# Patient Record
Sex: MALE | Race: WHITE | NOT HISPANIC OR LATINO | Employment: OTHER | URBAN - METROPOLITAN AREA
[De-identification: names, ages, dates, MRNs, and addresses within clinical notes are randomized per-mention and may not be internally consistent; named-entity substitution may affect disease eponyms.]

---

## 2017-07-21 ENCOUNTER — ALLSCRIPTS OFFICE VISIT (OUTPATIENT)
Dept: OTHER | Facility: OTHER | Age: 77
End: 2017-07-21

## 2017-07-21 LAB
BILIRUB UR QL STRIP: NORMAL
CLARITY UR: NORMAL
COLOR UR: YELLOW
GLUCOSE (HISTORICAL): NORMAL
HGB UR QL STRIP.AUTO: NORMAL
KETONES UR STRIP-MCNC: NORMAL MG/DL
LEUKOCYTE ESTERASE UR QL STRIP: NORMAL
NITRITE UR QL STRIP: NORMAL
PH UR STRIP.AUTO: 6.5 [PH]
PROT UR STRIP-MCNC: NORMAL MG/DL
SP GR UR STRIP.AUTO: 1.01
UROBILINOGEN UR QL STRIP.AUTO: 0.2

## 2017-07-25 ENCOUNTER — ALLSCRIPTS OFFICE VISIT (OUTPATIENT)
Dept: OTHER | Facility: OTHER | Age: 77
End: 2017-07-25

## 2017-07-25 LAB
BILIRUB UR QL STRIP: NORMAL
CLARITY UR: NORMAL
COLOR UR: YELLOW
GLUCOSE (HISTORICAL): NORMAL
HGB UR QL STRIP.AUTO: NORMAL
KETONES UR STRIP-MCNC: NORMAL MG/DL
LEUKOCYTE ESTERASE UR QL STRIP: NORMAL
NITRITE UR QL STRIP: NORMAL
PH UR STRIP.AUTO: 6 [PH]
PROT UR STRIP-MCNC: NORMAL MG/DL
SP GR UR STRIP.AUTO: 1.01
UROBILINOGEN UR QL STRIP.AUTO: 0.2

## 2018-01-12 VITALS
WEIGHT: 222 LBS | SYSTOLIC BLOOD PRESSURE: 130 MMHG | HEIGHT: 68 IN | BODY MASS INDEX: 33.65 KG/M2 | DIASTOLIC BLOOD PRESSURE: 82 MMHG

## 2018-01-12 NOTE — MISCELLANEOUS
Message   Recorded as Task   Date: 07/20/2017 02:15 PM, Created By: Katerina Zendejas   Task Name: Medical Complaint Callback   Assigned To: Elia NUNEZ,TEAM   Regarding Patient: Dory Garcia, Status: Active   Comment:    Cherie Dumont - 20 Jul 2017 2:15 PM     TASK CREATED  Caller: Self; Medical Complaint; [phone] (Home)  PATIENT CALLED SAYING HIS PCP TREATED HIM FOR AN INFECTION BECUASE HE HAS BURNING  HIS CULTURE CAME BACK NEGATIVE AND HE STILL HAS THE BURNING  HE HAS BEEN TAKING CIPRO AND IT DOESNT SEEM TO BE HELPING  Christina Stone - 20 Jul 2017 2:36 PM     TASK EDITED  Pt states that he is still having burning and frequency  Scheduled pt to see Waldo Jovel 07/21/17          Signatures   Electronically signed by : Srinivas Su, ; Jul 20 2017  2:36PM EST                       (Author)

## 2018-06-01 RX ORDER — SUCRALFATE 1 G/1
TABLET ORAL AS NEEDED
COMMUNITY

## 2018-06-01 RX ORDER — CHLORAL HYDRATE 500 MG
CAPSULE ORAL
COMMUNITY

## 2018-06-01 RX ORDER — SIMVASTATIN 80 MG
TABLET ORAL
COMMUNITY

## 2018-06-01 RX ORDER — PANTOPRAZOLE SODIUM 40 MG/1
TABLET, DELAYED RELEASE ORAL
COMMUNITY

## 2018-06-01 RX ORDER — TERAZOSIN 5 MG/1
CAPSULE ORAL
COMMUNITY

## 2018-06-01 RX ORDER — NIFEDIPINE 60 MG/1
TABLET, EXTENDED RELEASE ORAL
COMMUNITY

## 2018-06-01 RX ORDER — FINASTERIDE 5 MG/1
TABLET, FILM COATED ORAL
COMMUNITY
End: 2018-06-05 | Stop reason: SDUPTHER

## 2018-06-01 RX ORDER — NIACIN 500 MG
TABLET ORAL
COMMUNITY

## 2018-06-05 ENCOUNTER — OFFICE VISIT (OUTPATIENT)
Dept: UROLOGY | Facility: MEDICAL CENTER | Age: 78
End: 2018-06-05
Payer: MEDICARE

## 2018-06-05 VITALS
SYSTOLIC BLOOD PRESSURE: 124 MMHG | HEIGHT: 70 IN | DIASTOLIC BLOOD PRESSURE: 68 MMHG | WEIGHT: 231 LBS | BODY MASS INDEX: 33.07 KG/M2

## 2018-06-05 DIAGNOSIS — N40.1 BPH WITH OBSTRUCTION/LOWER URINARY TRACT SYMPTOMS: Primary | ICD-10-CM

## 2018-06-05 DIAGNOSIS — N13.8 BPH WITH OBSTRUCTION/LOWER URINARY TRACT SYMPTOMS: Primary | ICD-10-CM

## 2018-06-05 PROBLEM — K21.9 GERD (GASTROESOPHAGEAL REFLUX DISEASE): Status: ACTIVE | Noted: 2018-06-05

## 2018-06-05 PROBLEM — Z86.010 HISTORY OF COLON POLYPS: Status: ACTIVE | Noted: 2017-06-06

## 2018-06-05 PROBLEM — Z86.0100 HISTORY OF COLON POLYPS: Status: ACTIVE | Noted: 2017-06-06

## 2018-06-05 PROBLEM — E66.09 NON MORBID OBESITY DUE TO EXCESS CALORIES: Status: ACTIVE | Noted: 2017-06-06

## 2018-06-05 LAB
SL AMB  POCT GLUCOSE, UA: ABNORMAL
SL AMB LEUKOCYTE ESTERASE,UA: ABNORMAL
SL AMB POCT BILIRUBIN,UA: ABNORMAL
SL AMB POCT BLOOD,UA: ABNORMAL
SL AMB POCT CLARITY,UA: CLEAR
SL AMB POCT COLOR,UA: YELLOW
SL AMB POCT KETONES,UA: ABNORMAL
SL AMB POCT NITRITE,UA: ABNORMAL
SL AMB POCT PH,UA: 5.5
SL AMB POCT SPECIFIC GRAVITY,UA: 1.01
SL AMB POCT URINE PROTEIN: ABNORMAL
SL AMB POCT UROBILINOGEN: 0.2

## 2018-06-05 PROCEDURE — 99214 OFFICE O/P EST MOD 30 MIN: CPT | Performed by: UROLOGY

## 2018-06-05 PROCEDURE — 81003 URINALYSIS AUTO W/O SCOPE: CPT | Performed by: UROLOGY

## 2018-06-05 RX ORDER — SENNA PLUS 8.6 MG/1
TABLET ORAL
COMMUNITY

## 2018-06-05 RX ORDER — EPINEPHRINE 0.3 MG/.3ML
INJECTION SUBCUTANEOUS
COMMUNITY
Start: 2015-11-16

## 2018-06-05 RX ORDER — FERROUS SULFATE 325(65) MG
TABLET ORAL
COMMUNITY
Start: 2017-12-13

## 2018-06-05 RX ORDER — FINASTERIDE 5 MG/1
5 TABLET, FILM COATED ORAL DAILY
Qty: 90 TABLET | Refills: 3 | Status: SHIPPED | OUTPATIENT
Start: 2018-06-05

## 2018-06-05 RX ORDER — ECHINACEA ANGUSTIFOLIA ROOT 125 MG
TABLET ORAL
COMMUNITY

## 2018-06-05 NOTE — PROGRESS NOTES
IPSS Questionnaire (AUA-7): Over the past month    1)  How often have you had a sensation of not emptying your bladder completely after you finish urinating? 1 - Less than 1 time in 5   2)  How often have you had to urinate again less than two hours after you finished urinating? 1 - Less than 1 time in 5   3)  How often have you found you stopped and started again several times when you urinated? 0 - Not at all   4) How difficult have you found it to postpone urination? 1 - Less than 1 time in 5   5) How often have you had a weak urinary stream?  1 - Less than 1 time in 5   6) How often have you had to push or strain to begin urination? 0 - Not at all   7) How many times did you most typically get up to urinate from the time you went to bed until the time you got up in the morning?   2 - 2 times   Total Score:  6

## 2018-06-05 NOTE — LETTER
June 5, 2018     Theodore Valdez, [address] [facility]  [address] [address]    Patient: Letty Mratinez   YOB: 1940   Date of Visit: 6/5/2018       Dear Dr Mathilda Gitelman: Thank you for referring Letty Martinez to me for evaluation  Below are my notes for this consultation  If you have questions, please do not hesitate to call me  I look forward to following your patient along with you  Sincerely,        Mya Babcock MD        CC: No Recipients  Mya Babcock MD  6/5/2018  1:18 PM  Incomplete  Assessment/Plan:    No problem-specific Assessment & Plan notes found for this encounter  Diagnoses and all orders for this visit:    BPH with obstruction/lower urinary tract symptoms  -     POCT urine dip auto non-scope    Other orders  -     Multiple Vitamins-Minerals (CENTRUM SILVER 50+MEN PO); Take by mouth  -     finasteride (PROSCAR) 5 mg tablet; Take by mouth  -     niacin 500 mg tablet; Take by mouth  -     NIFEdipine (NIFEDICAL XL) 60 mg 24 hr tablet; Take by mouth  -     Omega-3 1000 MG CAPS; Take by mouth  -     pantoprazole (PROTONIX) 40 mg tablet; Take by mouth  -     Probiotic Product (PROBIOTIC-10) CAPS; Take by mouth  -     simvastatin (ZOCOR) 80 mg tablet; Take by mouth  -     sucralfate (CARAFATE) 1 g tablet; Take by mouth  -     terazosin (HYTRIN) 5 mg capsule; Take by mouth  -     Zinc Sulfate (ZINC 15) 66 MG TABS; Take by mouth  -     EPINEPHrine (EPIPEN 2-SHAMA) 0 3 mg/0 3 mL SOAJ; Use as directed for allergic reaction   -     ferrous sulfate 325 (65 Fe) mg tablet; 1 tab every other day for iron support  -     senna (SENOKOT) 8 6 MG tablet; Indications: Bowel Evacuation, Constipation  Take 1-3 tablets by mouth daily   -     Nutritional Supplements (PROSTAMEN) CAPS; ( PROSTA MEITO) Take 1 tab by mouth twice a day for prostate health  Subjective:      Patient ID: Letty Martinez is a 68 y o  male      HPI    The following portions of the patient's history were reviewed and updated as appropriate: allergies, current medications, past family history, past medical history, past social history, past surgical history and problem list     Review of Systems      Objective:      /68 (BP Location: Left arm, Patient Position: Sitting)   Ht 5' 10" (1 778 m)   Wt 105 kg (231 lb)   BMI 33 15 kg/m²           Physical Exam

## 2018-06-05 NOTE — ASSESSMENT & PLAN NOTE
AUA symptom score is 6 on combined medical therapy  He is satisfied with his voiding pattern  We will continue to follow on present therapy  Cystoscopy last year documented a 60 g prostate with moderate bladder trabeculation  He will return in 1 year  We discussed the pros and cons of PSA testing

## 2018-06-05 NOTE — PROGRESS NOTES
Assessment/Plan:    BPH with obstruction/lower urinary tract symptoms  AUA symptom score is 6 on combined medical therapy  He is satisfied with his voiding pattern  We will continue to follow on present therapy  Cystoscopy last year documented a 60 g prostate with moderate bladder trabeculation  He will return in 1 year  We discussed the pros and cons of PSA testing  Diagnoses and all orders for this visit:    BPH with obstruction/lower urinary tract symptoms  -     POCT urine dip auto non-scope    Other orders  -     Multiple Vitamins-Minerals (CENTRUM SILVER 50+MEN PO); Take by mouth  -     finasteride (PROSCAR) 5 mg tablet; Take by mouth  -     niacin 500 mg tablet; Take by mouth  -     NIFEdipine (NIFEDICAL XL) 60 mg 24 hr tablet; Take by mouth  -     Omega-3 1000 MG CAPS; Take by mouth  -     pantoprazole (PROTONIX) 40 mg tablet; Take by mouth  -     Probiotic Product (PROBIOTIC-10) CAPS; Take by mouth  -     simvastatin (ZOCOR) 80 mg tablet; Take by mouth  -     sucralfate (CARAFATE) 1 g tablet; Take by mouth  -     terazosin (HYTRIN) 5 mg capsule; Take by mouth  -     Zinc Sulfate (ZINC 15) 66 MG TABS; Take by mouth  -     EPINEPHrine (EPIPEN 2-SHAMA) 0 3 mg/0 3 mL SOAJ; Use as directed for allergic reaction   -     ferrous sulfate 325 (65 Fe) mg tablet; 1 tab every other day for iron support  -     senna (SENOKOT) 8 6 MG tablet; Indications: Bowel Evacuation, Constipation  Take 1-3 tablets by mouth daily   -     Nutritional Supplements (PROSTAMEN) CAPS; ( PROSTA MEITO) Take 1 tab by mouth twice a day for prostate health  Subjective:      Patient ID: Tony Gunn is a 68 y o  male  66-year-old male followed for lower tract symptoms secondary prostatic enlargement  He reports he is voiding well on combined therapy  His stream is adequate  He gets up twice a night to urinate  He denies gross hematuria, dysuria or symptoms of infection  He has no urgency or incontinence    He is satisfied with his voiding pattern  The following portions of the patient's history were reviewed and updated as appropriate: allergies, current medications, past family history, past medical history, past social history, past surgical history and problem list     Review of Systems   Constitutional: Negative for chills, diaphoresis, fatigue and fever  HENT: Negative  Eyes: Negative  Respiratory: Negative  Cardiovascular: Negative  Endocrine: Negative  Musculoskeletal: Negative  Skin: Negative  Allergic/Immunologic: Negative  Neurological: Negative  Hematological: Negative  Psychiatric/Behavioral: Negative  Objective:      /68 (BP Location: Left arm, Patient Position: Sitting)   Ht 5' 10" (1 778 m)   Wt 105 kg (231 lb)   BMI 33 15 kg/m²          Physical Exam   Constitutional: He is oriented to person, place, and time  He appears well-developed and well-nourished  HENT:   Head: Normocephalic and atraumatic  Eyes: Conjunctivae are normal    Neck: Neck supple  Cardiovascular: Normal rate  Pulmonary/Chest: Effort normal    Abdominal: Soft  Bowel sounds are normal  He exhibits no distension and no mass  There is no tenderness  There is no rebound, no guarding and no CVA tenderness  Hernia confirmed negative in the right inguinal area and confirmed negative in the left inguinal area  Question of left inguinal hernia   Genitourinary: Rectum normal, testes normal and penis normal  Prostate is enlarged  Prostate is not tender  Right testis shows no mass  Left testis shows no mass  No phimosis or hypospadias  Genitourinary Comments: Prostate 2 times enlarged and palpably benign  Musculoskeletal: He exhibits no edema  Neurological: He is alert and oriented to person, place, and time  Skin: Skin is warm and dry  Psychiatric: He has a normal mood and affect   His behavior is normal  Judgment and thought content normal    Nursing note and vitals reviewed

## 2018-06-05 NOTE — PATIENT INSTRUCTIONS
Benign Prostatic Hypertrophy   WHAT YOU NEED TO KNOW:   Benign prostatic hypertrophy (BPH) is a condition that causes your prostate gland to grow larger than normal  The prostate gland is the male sex gland that produces a fluid that is part of semen  It is about the size of a walnut and it is located under the bladder  As the prostate grows, it can squeeze the urethra  This can block urine flow and cause urinary problems  DISCHARGE INSTRUCTIONS:   Medicines:   · Alpha blockers: This medicine relaxes the muscles in your prostate and bladder  It may help you urinate more easily  · 5 alpha reductase inhibitors: These medicines block the production of a hormone that causes the prostate to get larger  It may help slow the growth of the prostate or shrink the prostate  · Take your medicine as directed  Contact your healthcare provider if you think your medicine is not helping or if you have side effects  Tell him or her if you are allergic to any medicine  Keep a list of the medicines, vitamins, and herbs you take  Include the amounts, and when and why you take them  Bring the list or the pill bottles to follow-up visits  Carry your medicine list with you in case of an emergency  Follow up with your healthcare provider as directed:  Write down your questions so you remember to ask them during your visits  Manage BPH:   · Do not let your bladder get too full before you empty it  Urinate when you feel the urge  · Limit alcohol  Do not drink large amounts of any liquid at one time  · Decrease the amount of salt you eat  Examples of salty foods are chips, cured meats, and canned soups  Do not use table salt  · Healthcare providers may tell you not to eat spicy foods such as chilli peppers  This may help you find out if spicy food makes your BPH symptoms worse  · You may have sex if you feel well  Contact your healthcare provider if:   · There is a large amount of blood in your urine  · Your signs and symptoms get worse  · You have a fever  · You have questions or concerns about your condition or care  Seek care immediately if:   · You are unable to urinate  · Your bladder feels very full and painful  © 2017 [employer] Information is for End User's use only and may not be sold, redistributed or otherwise used for commercial purposes  All illustrations and images included in CareNotes® are the copyrighted property of [employer] M [employer]  or [employer]  The above information is an  only  It is not intended as medical advice for individual conditions or treatments  Talk to your doctor, nurse or pharmacist before following any medical regimen to see if it is safe and effective for you

## 2019-08-07 ENCOUNTER — OFFICE VISIT (OUTPATIENT)
Dept: UROLOGY | Facility: MEDICAL CENTER | Age: 79
End: 2019-08-07
Payer: MEDICARE

## 2019-08-07 VITALS
BODY MASS INDEX: 34.28 KG/M2 | DIASTOLIC BLOOD PRESSURE: 76 MMHG | SYSTOLIC BLOOD PRESSURE: 140 MMHG | WEIGHT: 226.2 LBS | HEIGHT: 68 IN | HEART RATE: 58 BPM

## 2019-08-07 DIAGNOSIS — N13.8 BPH WITH OBSTRUCTION/LOWER URINARY TRACT SYMPTOMS: Primary | ICD-10-CM

## 2019-08-07 DIAGNOSIS — N40.1 BPH WITH OBSTRUCTION/LOWER URINARY TRACT SYMPTOMS: Primary | ICD-10-CM

## 2019-08-07 LAB
SL AMB  POCT GLUCOSE, UA: NEGATIVE
SL AMB LEUKOCYTE ESTERASE,UA: NEGATIVE
SL AMB POCT BILIRUBIN,UA: NEGATIVE
SL AMB POCT BLOOD,UA: NEGATIVE
SL AMB POCT CLARITY,UA: CLEAR
SL AMB POCT COLOR,UA: YELLOW
SL AMB POCT KETONES,UA: NEGATIVE
SL AMB POCT NITRITE,UA: NEGATIVE
SL AMB POCT PH,UA: 7
SL AMB POCT SPECIFIC GRAVITY,UA: 1.01
SL AMB POCT URINE PROTEIN: NEGATIVE
SL AMB POCT UROBILINOGEN: 1

## 2019-08-07 PROCEDURE — 99214 OFFICE O/P EST MOD 30 MIN: CPT | Performed by: UROLOGY

## 2019-08-07 PROCEDURE — 81003 URINALYSIS AUTO W/O SCOPE: CPT | Performed by: UROLOGY

## 2019-08-07 NOTE — LETTER
August 7, 2019     Ted Arenas, [address] [facility]  [address] [address]    Patient: Dalton Arenas   YOB: 1940   Date of Visit: 8/7/2019       Dear Dr Terrell Johnson: Thank you for referring Percy Winslow to me for evaluation  Below are my notes for this consultation  If you have questions, please do not hesitate to call me  I look forward to following your patient along with you  Sincerely,        Bj Sena MD        CC: No Recipients  Bj Sena MD  8/7/2019  9:22 AM  Sign at close encounter  Assessment/Plan:    BPH with obstruction/lower urinary tract symptoms  AUA symptom score is 8  He is satisfied with his voiding pattern on combined therapy  Urinalysis is negative  PSA last year was 0 7  We discussed the pros and cons of PSA testing and I do not feel he needs a repeat PSA at this time  We did discuss treatment options for his lower tract symptoms  If he would like to get off medication urolift would be a good choice  The procedure was described and risks discussed  Information was given  He will call if he wishes to proceed with a urolift evaluation  If not, he will remain on combined therapy and return in 1 year for follow-up  Diagnoses and all orders for this visit:    BPH with obstruction/lower urinary tract symptoms  -     POCT urine dip auto non-scope          Subjective:      Patient ID: Dalton Arenas is a 66 y o  male  Benign Prostatic Hypertrophy   This is a chronic problem  The current episode started more than 1 year ago  The problem is unchanged  Irritative symptoms include nocturia  Irritative symptoms do not include frequency or urgency  Obstructive symptoms include dribbling and a slower stream  Obstructive symptoms do not include incomplete emptying, an intermittent stream, straining or a weak stream  Pertinent negatives include no chills, dysuria, genital pain, hematuria, hesitancy, nausea or vomiting  AUA score is 8-19  His sexual activity is non-contributory to the current illness  The symptoms are aggravated by caffeine  Past treatments include terazosin and finasteride  The treatment provided moderate relief  He has been using treatment for 2 or more years  The following portions of the patient's history were reviewed and updated as appropriate: allergies, current medications, past family history, past medical history, past social history, past surgical history and problem list     Review of Systems   Constitutional: Negative for chills, diaphoresis, fatigue and fever  HENT: Negative  Eyes: Negative  Respiratory: Negative  Cardiovascular: Negative  Gastrointestinal: Positive for constipation  Negative for nausea and vomiting  Endocrine: Negative  Genitourinary: Positive for nocturia  Negative for dysuria, frequency, hematuria, hesitancy, incomplete emptying and urgency  See HPI   Musculoskeletal: Negative  Skin: Negative  Allergic/Immunologic: Negative  Neurological: Negative  Hematological: Negative  Psychiatric/Behavioral: Negative  AUA SYMPTOM SCORE      Most Recent Value   AUA SYMPTOM SCORE   How often have you had a sensation of not emptying your bladder completely after you finished urinating? 2   How often have you had to urinate again less than two hours after you finished urinating? 1   How often have you found you stopped and started again several times when you urinate? 1   How often have you found it difficult to postpone urination? 1   How often have you had a weak urinary stream?  1   How often have you had to push or strain to begin urination? 0   How many times did you most typically get up to urinate from the time you went to bed at night until the time you got up in the morning?   2   Quality of Life: If you were to spend the rest of your life with your urinary condition just the way it is now, how would you feel about that?  2   AUA SYMPTOM SCORE  8 Objective:      /76 (BP Location: Left arm, Patient Position: Sitting, Cuff Size: Standard)   Pulse 58   Ht 5' 7 5" (1 715 m)   Wt 103 kg (226 lb 3 2 oz)   BMI 34 91 kg/m²           Physical Exam   Constitutional: He is oriented to person, place, and time  He appears well-developed and well-nourished  HENT:   Head: Normocephalic and atraumatic  Eyes: Conjunctivae are normal    Neck: Neck supple  Cardiovascular: Normal rate  Pulmonary/Chest: Effort normal    Abdominal: Soft  Bowel sounds are normal  He exhibits no distension and no mass  There is no tenderness  There is no rebound, no guarding and no CVA tenderness  No hernia  Genitourinary: Rectum normal, testes normal and penis normal  Right testis shows no mass  Left testis shows no mass  No phimosis or hypospadias  Genitourinary Comments: Prostate 1 5 x enlarged and palpably benign   Musculoskeletal: He exhibits no edema  Neurological: He is alert and oriented to person, place, and time  Skin: Skin is warm and dry  Psychiatric: He has a normal mood and affect  His behavior is normal  Judgment and thought content normal    Vitals reviewed

## 2019-08-07 NOTE — PATIENT INSTRUCTIONS
Benign Prostatic Hypertrophy   WHAT YOU NEED TO KNOW:   Benign prostatic hypertrophy (BPH) is a condition that causes your prostate gland to grow larger than normal  The prostate gland is the male sex gland that produces a fluid that is part of semen  It is about the size of a walnut and it is located under the bladder  As the prostate grows, it can squeeze the urethra  This can block urine flow and cause urinary problems  DISCHARGE INSTRUCTIONS:   Medicines:   · Alpha blockers: This medicine relaxes the muscles in your prostate and bladder  It may help you urinate more easily  · 5 alpha reductase inhibitors: These medicines block the production of a hormone that causes the prostate to get larger  It may help slow the growth of the prostate or shrink the prostate  · Take your medicine as directed  Contact your healthcare provider if you think your medicine is not helping or if you have side effects  Tell him or her if you are allergic to any medicine  Keep a list of the medicines, vitamins, and herbs you take  Include the amounts, and when and why you take them  Bring the list or the pill bottles to follow-up visits  Carry your medicine list with you in case of an emergency  Follow up with your healthcare provider as directed:  Write down your questions so you remember to ask them during your visits  Manage BPH:   · Do not let your bladder get too full before you empty it  Urinate when you feel the urge  · Limit alcohol  Do not drink large amounts of any liquid at one time  · Decrease the amount of salt you eat  Examples of salty foods are chips, cured meats, and canned soups  Do not use table salt  · Healthcare providers may tell you not to eat spicy foods such as chilli peppers  This may help you find out if spicy food makes your BPH symptoms worse  · You may have sex if you feel well  Contact your healthcare provider if:   · There is a large amount of blood in your urine  · Your signs and symptoms get worse  · You have a fever  · You have questions or concerns about your condition or care  Seek care immediately if:   · You are unable to urinate  · Your bladder feels very full and painful  © 2017 [employer] Information is for End User's use only and may not be sold, redistributed or otherwise used for commercial purposes  All illustrations and images included in CareNotes® are the copyrighted property of [employer]  or [employer]  The above information is an  only  It is not intended as medical advice for individual conditions or treatments  Talk to your doctor, nurse or pharmacist before following any medical regimen to see if it is safe and effective for you      Urolift information was provided

## 2019-08-07 NOTE — PROGRESS NOTES
Assessment/Plan:    BPH with obstruction/lower urinary tract symptoms  AUA symptom score is 8  He is satisfied with his voiding pattern on combined therapy  Urinalysis is negative  PSA last year was 0 7  We discussed the pros and cons of PSA testing and I do not feel he needs a repeat PSA at this time  We did discuss treatment options for his lower tract symptoms  If he would like to get off medication urolift would be a good choice  The procedure was described and risks discussed  Information was given  He will call if he wishes to proceed with a urolift evaluation  If not, he will remain on combined therapy and return in 1 year for follow-up  Diagnoses and all orders for this visit:    BPH with obstruction/lower urinary tract symptoms  -     POCT urine dip auto non-scope          Subjective:      Patient ID: Mehdi Scruggs is a 66 y o  male  Benign Prostatic Hypertrophy   This is a chronic problem  The current episode started more than 1 year ago  The problem is unchanged  Irritative symptoms include nocturia  Irritative symptoms do not include frequency or urgency  Obstructive symptoms include dribbling and a slower stream  Obstructive symptoms do not include incomplete emptying, an intermittent stream, straining or a weak stream  Pertinent negatives include no chills, dysuria, genital pain, hematuria, hesitancy, nausea or vomiting  AUA score is 8-19  His sexual activity is non-contributory to the current illness  The symptoms are aggravated by caffeine  Past treatments include terazosin and finasteride  The treatment provided moderate relief  He has been using treatment for 2 or more years         The following portions of the patient's history were reviewed and updated as appropriate: allergies, current medications, past family history, past medical history, past social history, past surgical history and problem list     Review of Systems   Constitutional: Negative for chills, diaphoresis, fatigue and fever  HENT: Negative  Eyes: Negative  Respiratory: Negative  Cardiovascular: Negative  Gastrointestinal: Positive for constipation  Negative for nausea and vomiting  Endocrine: Negative  Genitourinary: Positive for nocturia  Negative for dysuria, frequency, hematuria, hesitancy, incomplete emptying and urgency  See HPI   Musculoskeletal: Negative  Skin: Negative  Allergic/Immunologic: Negative  Neurological: Negative  Hematological: Negative  Psychiatric/Behavioral: Negative  AUA SYMPTOM SCORE      Most Recent Value   AUA SYMPTOM SCORE   How often have you had a sensation of not emptying your bladder completely after you finished urinating? 2   How often have you had to urinate again less than two hours after you finished urinating? 1   How often have you found you stopped and started again several times when you urinate? 1   How often have you found it difficult to postpone urination? 1   How often have you had a weak urinary stream?  1   How often have you had to push or strain to begin urination? 0   How many times did you most typically get up to urinate from the time you went to bed at night until the time you got up in the morning? 2   Quality of Life: If you were to spend the rest of your life with your urinary condition just the way it is now, how would you feel about that?  2   AUA SYMPTOM SCORE  8        Objective:      /76 (BP Location: Left arm, Patient Position: Sitting, Cuff Size: Standard)   Pulse 58   Ht 5' 7 5" (1 715 m)   Wt 103 kg (226 lb 3 2 oz)   BMI 34 91 kg/m²          Physical Exam   Constitutional: He is oriented to person, place, and time  He appears well-developed and well-nourished  HENT:   Head: Normocephalic and atraumatic  Eyes: Conjunctivae are normal    Neck: Neck supple  Cardiovascular: Normal rate  Pulmonary/Chest: Effort normal    Abdominal: Soft   Bowel sounds are normal  He exhibits no distension and no mass  There is no tenderness  There is no rebound, no guarding and no CVA tenderness  No hernia  Genitourinary: Rectum normal, testes normal and penis normal  Right testis shows no mass  Left testis shows no mass  No phimosis or hypospadias  Genitourinary Comments: Prostate 1 5 x enlarged and palpably benign   Musculoskeletal: He exhibits no edema  Neurological: He is alert and oriented to person, place, and time  Skin: Skin is warm and dry  Psychiatric: He has a normal mood and affect  His behavior is normal  Judgment and thought content normal    Vitals reviewed

## 2019-08-07 NOTE — ASSESSMENT & PLAN NOTE
AUA symptom score is 8  He is satisfied with his voiding pattern on combined therapy  Urinalysis is negative  PSA last year was 0 7  We discussed the pros and cons of PSA testing and I do not feel he needs a repeat PSA at this time  We did discuss treatment options for his lower tract symptoms  If he would like to get off medication urolift would be a good choice  The procedure was described and risks discussed  Information was given  He will call if he wishes to proceed with a urolift evaluation  If not, he will remain on combined therapy and return in 1 year for follow-up

## 2020-08-03 ENCOUNTER — OFFICE VISIT (OUTPATIENT)
Dept: URGENT CARE | Facility: CLINIC | Age: 80
End: 2020-08-03
Payer: MEDICARE

## 2020-08-03 VITALS
SYSTOLIC BLOOD PRESSURE: 136 MMHG | BODY MASS INDEX: 31.84 KG/M2 | RESPIRATION RATE: 20 BRPM | WEIGHT: 215 LBS | HEART RATE: 76 BPM | DIASTOLIC BLOOD PRESSURE: 78 MMHG | OXYGEN SATURATION: 96 % | HEIGHT: 69 IN | TEMPERATURE: 99.6 F

## 2020-08-03 DIAGNOSIS — T14.8XXA SKIN AVULSION: Primary | ICD-10-CM

## 2020-08-03 PROCEDURE — G0463 HOSPITAL OUTPT CLINIC VISIT: HCPCS | Performed by: PHYSICIAN ASSISTANT

## 2020-08-03 PROCEDURE — 90471 IMMUNIZATION ADMIN: CPT | Performed by: PHYSICIAN ASSISTANT

## 2020-08-03 PROCEDURE — 12001 RPR S/N/AX/GEN/TRNK 2.5CM/<: CPT | Performed by: PHYSICIAN ASSISTANT

## 2020-08-03 PROCEDURE — 90715 TDAP VACCINE 7 YRS/> IM: CPT

## 2020-08-03 PROCEDURE — 99213 OFFICE O/P EST LOW 20 MIN: CPT | Performed by: PHYSICIAN ASSISTANT

## 2020-08-03 RX ORDER — NIFEDIPINE 30 MG/1
TABLET, EXTENDED RELEASE ORAL
COMMUNITY
Start: 2020-07-09

## 2020-08-03 RX ORDER — ALLOPURINOL 100 MG/1
TABLET ORAL
COMMUNITY
Start: 2020-07-09

## 2020-08-03 NOTE — PATIENT INSTRUCTIONS
Skin Avulsion   WHAT YOU NEED TO KNOW:   Skin avulsion is a wound that happens when skin is torn from your body during an accident or other injury  The torn skin may be lost or too damaged to be repaired, and it must be removed  A wound of this type cannot be stitched closed because there is tissue missing  Avulsion wounds are usually bigger and have more scars because of the missing tissue  DISCHARGE INSTRUCTIONS:   Medicines:   · Antibiotic ointment:  Your healthcare provider may tell you to gently rub a topical antibiotic ointment on your wound  This will help prevent an infection and help your wound heal faster  · Pain medicine: You may be given medicine to take away or decrease pain  Do not wait until the pain is severe before you take your medicine  · NSAIDs , such as ibuprofen, help decrease swelling, pain, and fever  This medicine is available with or without a doctor's order  NSAIDs can cause stomach bleeding or kidney problems in certain people  If you take blood thinner medicine, always ask if NSAIDs are safe for you  Always read the medicine label and follow directions  Do not give these medicines to children under 10months of age without direction from your child's healthcare provider  · Take your medicine as directed  Contact your healthcare provider if you think your medicine is not helping or if you have side effects  Tell him of her if you are allergic to any medicine  Keep a list of the medicines, vitamins, and herbs you take  Include the amounts, and when and why you take them  Bring the list or the pill bottles to follow-up visits  Carry your medicine list with you in case of an emergency  Care for your wound:  Avulsion wounds may take longer to heal because they cannot be closed with tape or stitches  Keep your wound clean and protected to prevent infection and speed healing  · Clean your wound:  Wash your hands with soap and water before and after you care for your wound   You may be able to use a soft cloth to gently clean the wound after the first 24 to 48 hours  After that, gently clean the wound once or twice a day with cool water  Do not soak your wound  Use soap to clean around the wound, but try not to get any on the wound itself  Do not use alcohol or hydrogen peroxide to clean your wound unless you are directed to  Gently pat the area dry and reapply the bandage as directed  · Elevate your wound:  Prop your injured area on pillows to raise it above the level of your heart  This will help reduce pain and swelling  Do this for 30 minutes at a time, as often as you can  · Bandage your wound:  Bandages keep your wound clean, dry, and protected from infection  They may also prevent swelling  Use a bandage that does not stick to your wound, and has a spongy layer to absorb fluids  Leave your bandage on as long as directed  Ask your healthcare provider when and how to change your bandage  Do not wrap the bandage too tightly  This could cut off blood flow and cause more injury  · Use cool compresses:  Wet a washcloth or towel with cool water and hold it on your wound as directed  Ask how often to apply the compress and for how long each time  · Reduce scarring:  Avoid direct sunlight on your wound  Sunlight may burn or change the color of the new skin over your wound  Use sunscreen (SPF 30 or higher) on the new skin for at least 1 year after it heals  Support for leg and arm wounds: You may need to use crutches if the wound is on your leg  You may need to use a sling if the wound is on your arm  Crutches and slings help protect the injured area, prevent further injury, and heal the area in the right position  Follow up with your healthcare provider within 2 days or as directed: If you have stitches, ask when to return to have them removed  Write down your questions so you remember to ask them during your visits     Contact your healthcare provider if:   · You have new pain, or it gets worse  · You have trouble moving the injured body area  · Your wound splits open or does not seem to be healing  Return to the emergency department if:   · You have a fever  · You have painful swelling, redness, or warmth around your wound  · Your wound is red and there are red streaks on your skin starting at your wound and moving upward  · Your wound is draining pus  · You have heavy bleeding or bleeding that does not stop after 10 minutes of holding firm, direct pressure over the wound  · You feel like there is an object stuck in your wound  © 2017 [employer] Information is for End User's use only and may not be sold, redistributed or otherwise used for commercial purposes  All illustrations and images included in CareNotes® are the copyrighted property of [employer]  or [employer] K[employer]  The above information is an  only  It is not intended as medical advice for individual conditions or treatments  Talk to your doctor, nurse or pharmacist before following any medical regimen to see if it is safe and effective for you

## 2020-08-03 NOTE — PROGRESS NOTES
[facility]        NAME: John Cole is a 78 y o  male  : 1940    MRN: [id number]  DATE: August 3, 2020  TIME: 3:55 PM    Assessment and Plan   Skin avulsion [T14  8XXA]  1  Skin avulsion  silver nitrate-potassium nitrate (ARZOL SILVER NITRATE) 38-71 % applicator 1 applicator    TDAP Vaccine greater than or equal to 8yo     Laceration repair    Date/Time: 8/3/2020 3:51 PM  Performed by: Elaine Mckeon PA-C  Authorized by: Elaine Mckeon PA-C   Consent: Verbal consent obtained  Consent given by: patient  Body area: upper extremity  Location details: left long finger  Laceration length: 1 cm  Foreign bodies: no foreign bodies  Tendon involvement: none  Nerve involvement: none  Vascular damage: no      Procedure Details:  Wound skin closure material used: No closure for skin avulsion  Silver nitrate stick used followed by gelfoam dressing  Dressing: gauze roll  Patient tolerance: Patient tolerated the procedure well with no immediate complications          Patient Instructions   Patient Instructions   Skin Avulsion   WHAT YOU NEED TO KNOW:   Skin avulsion is a wound that happens when skin is torn from your body during an accident or other injury  The torn skin may be lost or too damaged to be repaired, and it must be removed  A wound of this type cannot be stitched closed because there is tissue missing  Avulsion wounds are usually bigger and have more scars because of the missing tissue  DISCHARGE INSTRUCTIONS:   Medicines:   · Antibiotic ointment:  Your healthcare provider may tell you to gently rub a topical antibiotic ointment on your wound  This will help prevent an infection and help your wound heal faster  · Pain medicine: You may be given medicine to take away or decrease pain  Do not wait until the pain is severe before you take your medicine  · NSAIDs , such as ibuprofen, help decrease swelling, pain, and fever   This medicine is available with or without a doctor's order  NSAIDs can cause stomach bleeding or kidney problems in certain people  If you take blood thinner medicine, always ask if NSAIDs are safe for you  Always read the medicine label and follow directions  Do not give these medicines to children under 10months of age without direction from your child's healthcare provider  · Take your medicine as directed  Contact your healthcare provider if you think your medicine is not helping or if you have side effects  Tell him of her if you are allergic to any medicine  Keep a list of the medicines, vitamins, and herbs you take  Include the amounts, and when and why you take them  Bring the list or the pill bottles to follow-up visits  Carry your medicine list with you in case of an emergency  Care for your wound:  Avulsion wounds may take longer to heal because they cannot be closed with tape or stitches  Keep your wound clean and protected to prevent infection and speed healing  · Clean your wound:  Wash your hands with soap and water before and after you care for your wound  You may be able to use a soft cloth to gently clean the wound after the first 24 to 48 hours  After that, gently clean the wound once or twice a day with cool water  Do not soak your wound  Use soap to clean around the wound, but try not to get any on the wound itself  Do not use alcohol or hydrogen peroxide to clean your wound unless you are directed to  Gently pat the area dry and reapply the bandage as directed  · Elevate your wound:  Prop your injured area on pillows to raise it above the level of your heart  This will help reduce pain and swelling  Do this for 30 minutes at a time, as often as you can  · Bandage your wound:  Bandages keep your wound clean, dry, and protected from infection  They may also prevent swelling  Use a bandage that does not stick to your wound, and has a spongy layer to absorb fluids  Leave your bandage on as long as directed   Ask your healthcare provider when and how to change your bandage  Do not wrap the bandage too tightly  This could cut off blood flow and cause more injury  · Use cool compresses:  Wet a washcloth or towel with cool water and hold it on your wound as directed  Ask how often to apply the compress and for how long each time  · Reduce scarring:  Avoid direct sunlight on your wound  Sunlight may burn or change the color of the new skin over your wound  Use sunscreen (SPF 30 or higher) on the new skin for at least 1 year after it heals  Support for leg and arm wounds: You may need to use crutches if the wound is on your leg  You may need to use a sling if the wound is on your arm  Crutches and slings help protect the injured area, prevent further injury, and heal the area in the right position  Follow up with your healthcare provider within 2 days or as directed: If you have stitches, ask when to return to have them removed  Write down your questions so you remember to ask them during your visits  Contact your healthcare provider if:   · You have new pain, or it gets worse  · You have trouble moving the injured body area  · Your wound splits open or does not seem to be healing  Return to the emergency department if:   · You have a fever  · You have painful swelling, redness, or warmth around your wound  · Your wound is red and there are red streaks on your skin starting at your wound and moving upward  · Your wound is draining pus  · You have heavy bleeding or bleeding that does not stop after 10 minutes of holding firm, direct pressure over the wound  · You feel like there is an object stuck in your wound  © 2017 [employer] Information is for End User's use only and may not be sold, redistributed or otherwise used for commercial purposes  All illustrations and images included in CareNotes® are the copyrighted property of [employer]  or [employer] Kee    The above information is an  only  It is not intended as medical advice for individual conditions or treatments  Talk to your doctor, nurse or pharmacist before following any medical regimen to see if it is safe and effective for you  Proceed to  ER if symptoms worsen  Chief Complaint     Chief Complaint   Patient presents with    Finger Laceration     left middle finger (tip) sliced off appx 30 mins ago with gardening toshia  Site cleansed with Dermal Wound Cleanser and pressure drsg applied  History of Present Illness       Patient presents for evaluation of a laceration to the tip of his left 3rd finger  He states that he cut the finger with hedge tremors about 30 minutes prior to arrival   He is not on any blood thinning medications  He is unsure of when his last tetanus vaccination was  Review of Systems   Review of Systems   Musculoskeletal: Negative  Skin: Positive for wound  Hematological: Negative            Current Medications       Current Outpatient Medications:     EPINEPHrine (EPIPEN 2-SHAMA) 0 3 mg/0 3 mL SOAJ, Use as directed for allergic reaction , Disp: , Rfl:     ferrous sulfate 325 (65 Fe) mg tablet, 1 tab every other day for iron support, Disp: , Rfl:     finasteride (PROSCAR) 5 mg tablet, Take 1 tablet (5 mg total) by mouth daily, Disp: 90 tablet, Rfl: 3    Multiple Vitamins-Minerals (CENTRUM SILVER 50+MEN PO), Take by mouth, Disp: , Rfl:     niacin 500 mg tablet, Take by mouth, Disp: , Rfl:     NIFEdipine (NIFEDICAL XL) 60 mg 24 hr tablet, Take by mouth, Disp: , Rfl:     Nutritional Supplements (PROSTAMEN) CAPS, ( PROSTA MEITO) Take 1 tab by mouth twice a day for prostate health , Disp: , Rfl:     pantoprazole (PROTONIX) 40 mg tablet, Take by mouth, Disp: , Rfl:     Probiotic Product (PROBIOTIC-10) CAPS, Take by mouth, Disp: , Rfl:     simvastatin (ZOCOR) 80 mg tablet, Take by mouth, Disp: , Rfl:     sucralfate (CARAFATE) 1 g tablet, Take by mouth as needed , Disp: , Rfl:     terazosin (HYTRIN) 5 mg capsule, Take by mouth, Disp: , Rfl:     Zinc Sulfate (ZINC 15) 66 MG TABS, Take by mouth, Disp: , Rfl:     allopurinol (ZYLOPRIM) 100 mg tablet, , Disp: , Rfl:     NIFEdipine (PROCARDIA XL) 30 mg 24 hr tablet, , Disp: , Rfl:     Omega-3 1000 MG CAPS, Take by mouth, Disp: , Rfl:     senna (SENOKOT) 8 6 MG tablet, Indications: Bowel Evacuation, Constipation  Take 1-3 tablets by mouth daily , Disp: , Rfl:   No current facility-administered medications for this visit  Current Allergies     Allergies as of 08/03/2020 - Reviewed 08/03/2020   Allergen Reaction Noted    Other Other (See Comments)             The following portions of the patient's history were reviewed and updated as appropriate: allergies, current medications, past family history, past medical history, past social history, past surgical history and problem list      Past Medical History:   Diagnosis Date    BPH with obstruction/lower urinary tract symptoms     Dysuria     Elevated PSA     Nocturia        Past Surgical History:   Procedure Laterality Date    CATARACT EXTRACTION      COLONOSCOPY         No family history on file  Medications have been verified  Objective   /78 (BP Location: Right arm, Patient Position: Supine, Cuff Size: Large)   Pulse 76   Temp 99 6 °F (37 6 °C)   Resp 20   Ht 5' 9"   Wt 97 5 kg (215 lb)   SpO2 96%   BMI 31 75 kg/m²          Physical Exam     Physical Exam   Constitutional: No distress  Cardiovascular: Normal pulses  Neurological: He is alert  Skin: Skin is warm and dry  Capillary refill takes less than 2 seconds  Laceration (1cm avulsion of skin of distal left 3rd digit  No nail involvement) noted  Vitals reviewed

## 2020-08-11 ENCOUNTER — OFFICE VISIT (OUTPATIENT)
Dept: URGENT CARE | Facility: CLINIC | Age: 80
End: 2020-08-11
Payer: MEDICARE

## 2020-08-11 VITALS
HEART RATE: 104 BPM | DIASTOLIC BLOOD PRESSURE: 67 MMHG | RESPIRATION RATE: 18 BRPM | TEMPERATURE: 98.4 F | WEIGHT: 212 LBS | SYSTOLIC BLOOD PRESSURE: 120 MMHG | HEIGHT: 69 IN | BODY MASS INDEX: 31.4 KG/M2 | OXYGEN SATURATION: 96 %

## 2020-08-11 DIAGNOSIS — Z51.89 VISIT FOR WOUND CHECK: Primary | ICD-10-CM

## 2020-08-11 PROCEDURE — 99212 OFFICE O/P EST SF 10 MIN: CPT | Performed by: PHYSICIAN ASSISTANT

## 2020-08-11 PROCEDURE — G0463 HOSPITAL OUTPT CLINIC VISIT: HCPCS | Performed by: PHYSICIAN ASSISTANT

## 2020-08-11 NOTE — PROGRESS NOTES
[facility]        NAME: Carol Velez is a 78 y o  male  : 1940    MRN: [id number]  DATE: 2020  TIME: 8:09 AM    Assessment and Plan   Visit for wound check [Z51 89]  1  Visit for wound check           Patient Instructions       Follow up with PCP in 3-5 days  Proceed to  ER if symptoms worsen  Chief Complaint     Chief Complaint   Patient presents with    Finger Laceration     tip of left middle finger still bleeds         History of Present Illness       26-year-old male presents to the clinic for wound check of left middle finger  Patient was seen approximately 1 week ago for a skin avulsion to the tip of his left middle finger  Patient states that he had the area cauterized and Gel-Foam was placed over the area  Patient was then given a finger splint but states that he has not been using it recently  Patient states that today he was outside doing gardening and cutting grass when he must have hit it against something and it started bleeding  Patient states that the bleeding was controlled and he placed a Band-Aid over the area and came to Urgent Care to be seen  Patient states that he still has some Gel-Foam over the tip of the finger and states that the area that started bleeding was off to the side where the Gel-Foam has already dissolved  Patient denies any current blood thinners  Patient is right-handed  Review of Systems   Review of Systems   Constitutional: Negative for chills and fever  Respiratory: Negative for chest tightness, shortness of breath and wheezing  Cardiovascular: Negative for chest pain and palpitations  Gastrointestinal: Negative for nausea and vomiting  Musculoskeletal: Negative for arthralgias and myalgias  Skin: Positive for wound  Negative for color change and rash  Neurological: Negative for dizziness, weakness, light-headedness, numbness and headaches  All other systems reviewed and are negative          Current Medications       Current Outpatient Medications:     allopurinol (ZYLOPRIM) 100 mg tablet, , Disp: , Rfl:     EPINEPHrine (EPIPEN 2-SHAMA) 0 3 mg/0 3 mL SOAJ, Use as directed for allergic reaction , Disp: , Rfl:     ferrous sulfate 325 (65 Fe) mg tablet, 1 tab every other day for iron support, Disp: , Rfl:     finasteride (PROSCAR) 5 mg tablet, Take 1 tablet (5 mg total) by mouth daily, Disp: 90 tablet, Rfl: 3    Multiple Vitamins-Minerals (CENTRUM SILVER 50+MEN PO), Take by mouth, Disp: , Rfl:     niacin 500 mg tablet, Take by mouth, Disp: , Rfl:     NIFEdipine (NIFEDICAL XL) 60 mg 24 hr tablet, Take by mouth, Disp: , Rfl:     NIFEdipine (PROCARDIA XL) 30 mg 24 hr tablet, , Disp: , Rfl:     Nutritional Supplements (PROSTAMEN) CAPS, ( PROSTA MEITO) Take 1 tab by mouth twice a day for prostate health , Disp: , Rfl:     Omega-3 1000 MG CAPS, Take by mouth, Disp: , Rfl:     pantoprazole (PROTONIX) 40 mg tablet, Take by mouth, Disp: , Rfl:     Probiotic Product (PROBIOTIC-10) CAPS, Take by mouth, Disp: , Rfl:     senna (SENOKOT) 8 6 MG tablet, Indications: Bowel Evacuation, Constipation   Take 1-3 tablets by mouth daily , Disp: , Rfl:     simvastatin (ZOCOR) 80 mg tablet, Take by mouth, Disp: , Rfl:     sucralfate (CARAFATE) 1 g tablet, Take by mouth as needed , Disp: , Rfl:     terazosin (HYTRIN) 5 mg capsule, Take by mouth, Disp: , Rfl:     Zinc Sulfate (ZINC 15) 66 MG TABS, Take by mouth, Disp: , Rfl:     Current Allergies     Allergies as of 08/11/2020 - Reviewed 08/11/2020   Allergen Reaction Noted    Other Other (See Comments)             The following portions of the patient's history were reviewed and updated as appropriate: allergies, current medications, past family history, past medical history, past social history, past surgical history and problem list      Past Medical History:   Diagnosis Date    BPH with obstruction/lower urinary tract symptoms     Dysuria     Elevated PSA     Nocturia        Past Surgical History:   Procedure Laterality Date    CATARACT EXTRACTION      COLONOSCOPY         No family history on file  Medications have been verified  Objective   /67   Pulse 104   Temp 98 4 °F (36 9 °C)   Resp 18   Ht 5' 9" (1 753 m)   Wt 96 2 kg (212 lb)   SpO2 96%   BMI 31 31 kg/m²        Physical Exam     Physical Exam  Vitals signs and nursing note reviewed  Constitutional:       General: He is not in acute distress  Appearance: He is well-developed  He is not diaphoretic  HENT:      Head: Normocephalic and atraumatic  Cardiovascular:      Pulses: Normal pulses  Pulmonary:      Effort: Pulmonary effort is normal    Musculoskeletal: Normal range of motion  Left hand: He exhibits laceration  He exhibits normal range of motion, no tenderness, no bony tenderness, normal two-point discrimination, normal capillary refill and no swelling  Normal sensation noted  Normal strength noted  Comments: Scab noted to the tip of the left middle finger with some Gel-Foam still in place  No active bleeding, sensation intact, pulses intact  See picture below  Skin:     General: Skin is warm and dry  Neurological:      Mental Status: He is alert and oriented to person, place, and time

## 2020-08-18 ENCOUNTER — OFFICE VISIT (OUTPATIENT)
Dept: UROLOGY | Facility: MEDICAL CENTER | Age: 80
End: 2020-08-18
Payer: MEDICARE

## 2020-08-18 VITALS
TEMPERATURE: 97.3 F | HEIGHT: 69 IN | SYSTOLIC BLOOD PRESSURE: 132 MMHG | DIASTOLIC BLOOD PRESSURE: 80 MMHG | WEIGHT: 218 LBS | BODY MASS INDEX: 32.29 KG/M2

## 2020-08-18 DIAGNOSIS — N13.8 BPH WITH OBSTRUCTION/LOWER URINARY TRACT SYMPTOMS: Primary | ICD-10-CM

## 2020-08-18 DIAGNOSIS — N40.1 BPH WITH OBSTRUCTION/LOWER URINARY TRACT SYMPTOMS: Primary | ICD-10-CM

## 2020-08-18 PROCEDURE — 99214 OFFICE O/P EST MOD 30 MIN: CPT | Performed by: UROLOGY

## 2020-08-18 NOTE — PROGRESS NOTES
Assessment/Plan:    BPH with obstruction/lower urinary tract symptoms  Patient remains on combined medical therapy with terazosin and finasteride  He is doing well  AUA symptom score is 3  Digital rectal examination is benign in nature  We will continue to follow on present therapy  We did discuss the use of minimally invasive therapy such as urolift to get him off his medications  He will consider this option  If all is well he will return in 1 year  We will check a urinalysis with microscopic prior to his next visit  Diagnoses and all orders for this visit:    BPH with obstruction/lower urinary tract symptoms  -     Urinalysis with microscopic; Future          Subjective:      Patient ID: Fran Jang is a 78 y o  male  Benign Prostatic Hypertrophy   This is a chronic problem  The current episode started more than 1 year ago  The problem has been gradually improving since onset  Irritative symptoms include nocturia (improved 0-1)  Irritative symptoms do not include frequency or urgency  Obstructive symptoms include a slower stream  Obstructive symptoms do not include dribbling, incomplete emptying, an intermittent stream, straining or a weak stream  Pertinent negatives include no chills, dysuria, genital pain, hematuria, hesitancy, nausea or vomiting  AUA score is 0-7  His sexual activity is non-contributory to the current illness  The symptoms are aggravated by caffeine  Past treatments include terazosin and finasteride  The treatment provided moderate relief  He has been using treatment for 2 or more years  The following portions of the patient's history were reviewed and updated as appropriate: allergies, current medications, past family history, past medical history, past social history, past surgical history and problem list     Review of Systems   Constitutional: Negative for chills, diaphoresis, fatigue and fever  HENT: Negative  Eyes: Negative  Respiratory: Negative  Cardiovascular: Negative  Gastrointestinal: Positive for constipation  Negative for nausea and vomiting  Endocrine: Negative  Genitourinary: Positive for nocturia (improved 0-1)  Negative for dysuria, frequency, hematuria, hesitancy, incomplete emptying and urgency  See HPI   Musculoskeletal: Negative  Skin: Negative  Allergic/Immunologic: Negative  Neurological: Negative  Hematological: Negative  Psychiatric/Behavioral: Negative  AUA SYMPTOM SCORE      Most Recent Value   AUA SYMPTOM SCORE   How often have you had a sensation of not emptying your bladder completely after you finished urinating? 1   How often have you had to urinate again less than two hours after you finished urinating? 1   How often have you found you stopped and started again several times when you urinate?  0   How often have you found it difficult to postpone urination? 0   How often have you had a weak urinary stream?  0   How often have you had to push or strain to begin urination? 0   How many times did you most typically get up to urinate from the time you went to bed at night until the time you got up in the morning? 1   Quality of Life: If you were to spend the rest of your life with your urinary condition just the way it is now, how would you feel about that?  1   AUA SYMPTOM SCORE  3        Objective:      /80   Temp (!) 97 3 °F (36 3 °C)   Ht 5' 9" (1 753 m)   Wt 98 9 kg (218 lb)   BMI 32 19 kg/m²          Physical Exam  Vitals signs reviewed  Constitutional:       Appearance: He is well-developed  HENT:      Head: Normocephalic and atraumatic  Eyes:      Conjunctiva/sclera: Conjunctivae normal    Neck:      Musculoskeletal: Neck supple  Cardiovascular:      Rate and Rhythm: Normal rate  Pulmonary:      Effort: Pulmonary effort is normal    Abdominal:      General: Bowel sounds are normal  There is no distension  Palpations: Abdomen is soft  There is no mass  Tenderness: There is no abdominal tenderness  There is no guarding or rebound  Hernia: No hernia is present  Genitourinary:     Penis: Normal  No phimosis or hypospadias  Scrotum/Testes: Normal          Right: Mass not present  Left: Mass not present  Rectum: Normal       Comments: Prostate 1 5 x enlarged and palpably benign  Skin:     General: Skin is warm and dry  Neurological:      Mental Status: He is alert and oriented to person, place, and time  Psychiatric:         Behavior: Behavior normal          Thought Content:  Thought content normal          Judgment: Judgment normal

## 2020-08-18 NOTE — ASSESSMENT & PLAN NOTE
Patient remains on combined medical therapy with terazosin and finasteride  He is doing well  AUA symptom score is 3  Digital rectal examination is benign in nature  We will continue to follow on present therapy  We did discuss the use of minimally invasive therapy such as urolift to get him off his medications  He will consider this option  If all is well he will return in 1 year  We will check a urinalysis with microscopic prior to his next visit

## 2020-08-24 ENCOUNTER — OFFICE VISIT (OUTPATIENT)
Dept: URGENT CARE | Facility: CLINIC | Age: 80
End: 2020-08-24
Payer: MEDICARE

## 2020-08-24 VITALS
DIASTOLIC BLOOD PRESSURE: 72 MMHG | HEIGHT: 69 IN | BODY MASS INDEX: 32.29 KG/M2 | TEMPERATURE: 98.2 F | WEIGHT: 218 LBS | SYSTOLIC BLOOD PRESSURE: 165 MMHG | OXYGEN SATURATION: 98 % | HEART RATE: 65 BPM | RESPIRATION RATE: 16 BRPM

## 2020-08-24 DIAGNOSIS — Z51.89 VISIT FOR WOUND CHECK: Primary | ICD-10-CM

## 2020-08-24 PROCEDURE — G0463 HOSPITAL OUTPT CLINIC VISIT: HCPCS | Performed by: PHYSICIAN ASSISTANT

## 2020-08-24 PROCEDURE — 99213 OFFICE O/P EST LOW 20 MIN: CPT | Performed by: PHYSICIAN ASSISTANT

## 2020-08-28 NOTE — PROGRESS NOTES
[facility]        NAME: Dedrick Martinez is a 78 y o  male  : 1940    MRN: [id number]  DATE: 2020  TIME: 1:58 AM    Assessment and Plan   Visit for wound check [Z51 89]  1  Visit for wound check           Patient Instructions       Follow up with PCP in 3-5 days  Proceed to  ER if symptoms worsen  Chief Complaint     Chief Complaint   Patient presents with    Wound Check     Pt here for check of skin avulsion  History of Present Illness       57-year-old male presents the clinic for wound recheck from a skin avulsion to the tip of the left middle finger that occurred 2020  Patient was originally treated with cauterization and Gel-Foam   Patient states today that he still has some Gel-Foam to the tip of the finger which has lifted up with his scab and he would like the finger checked since the Gel-Foam is about to fall off to make sure that everything looks well  Patient denies any fevers, chills, pus or drainage from the area, redness, swelling, increased warmth, streaking redness, increased pain  Review of Systems   Review of Systems   Constitutional: Negative for chills and fever  Respiratory: Negative for chest tightness, shortness of breath and wheezing  Cardiovascular: Negative for chest pain and palpitations  Gastrointestinal: Negative for nausea and vomiting  Musculoskeletal: Negative for arthralgias and myalgias  Skin: Positive for wound  Negative for color change and rash  Neurological: Negative for dizziness, weakness, light-headedness, numbness and headaches  All other systems reviewed and are negative          Current Medications       Current Outpatient Medications:     allopurinol (ZYLOPRIM) 100 mg tablet, , Disp: , Rfl:     EPINEPHrine (EPIPEN 2-SHAMA) 0 3 mg/0 3 mL SOAJ, Use as directed for allergic reaction , Disp: , Rfl:     ferrous sulfate 325 (65 Fe) mg tablet, 1 tab every other day for iron support, Disp: , Rfl:    finasteride (PROSCAR) 5 mg tablet, Take 1 tablet (5 mg total) by mouth daily, Disp: 90 tablet, Rfl: 3    Multiple Vitamins-Minerals (CENTRUM SILVER 50+MEN PO), Take by mouth, Disp: , Rfl:     niacin 500 mg tablet, Take by mouth, Disp: , Rfl:     NIFEdipine (NIFEDICAL XL) 60 mg 24 hr tablet, Take by mouth, Disp: , Rfl:     NIFEdipine (PROCARDIA XL) 30 mg 24 hr tablet, , Disp: , Rfl:     Nutritional Supplements (PROSTAMEN) CAPS, ( PROSTA MEITO) Take 1 tab by mouth twice a day for prostate health , Disp: , Rfl:     Omega-3 1000 MG CAPS, Take by mouth, Disp: , Rfl:     pantoprazole (PROTONIX) 40 mg tablet, Take by mouth, Disp: , Rfl:     Probiotic Product (PROBIOTIC-10) CAPS, Take by mouth, Disp: , Rfl:     senna (SENOKOT) 8 6 MG tablet, Indications: Bowel Evacuation, Constipation  Take 1-3 tablets by mouth daily , Disp: , Rfl:     simvastatin (ZOCOR) 80 mg tablet, Take by mouth, Disp: , Rfl:     sucralfate (CARAFATE) 1 g tablet, Take by mouth as needed , Disp: , Rfl:     terazosin (HYTRIN) 5 mg capsule, Take by mouth, Disp: , Rfl:     Zinc Sulfate (ZINC 15) 66 MG TABS, Take by mouth, Disp: , Rfl:     Current Allergies     Allergies as of 08/24/2020 - Reviewed 08/24/2020   Allergen Reaction Noted    Other Other (See Comments)             The following portions of the patient's history were reviewed and updated as appropriate: allergies, current medications, past family history, past medical history, past social history, past surgical history and problem list      Past Medical History:   Diagnosis Date    BPH with obstruction/lower urinary tract symptoms     Dysuria     Elevated PSA     Nocturia        Past Surgical History:   Procedure Laterality Date    CATARACT EXTRACTION      COLONOSCOPY         History reviewed  No pertinent family history  Medications have been verified          Objective   /72   Pulse 65   Temp 98 2 °F (36 8 °C)   Resp 16   Ht 5' 9" (1 753 m)   Wt 98 9 kg (218 lb)   SpO2 98%   BMI 32 19 kg/m²        Physical Exam     Physical Exam  Vitals signs and nursing note reviewed  Constitutional:       General: He is not in acute distress  Appearance: He is well-developed  He is not diaphoretic  HENT:      Head: Normocephalic and atraumatic  Cardiovascular:      Pulses: Normal pulses  Pulmonary:      Effort: Pulmonary effort is normal    Musculoskeletal: Normal range of motion  Left hand: He exhibits normal range of motion, no tenderness, no bony tenderness, normal two-point discrimination, normal capillary refill, no deformity, no laceration and no swelling  Normal sensation noted  Normal strength noted  Comments: Patient was able to remove the scab to the tip of the finger without any bleeding or signs of infection  No TTP, full ROM of finger, hand, sensation intact, pulses intact, cap refill < 2 sec  Skin:     General: Skin is warm and dry  Capillary Refill: Capillary refill takes less than 2 seconds  Neurological:      Mental Status: He is alert and oriented to person, place, and time

## 2020-10-12 ENCOUNTER — OFFICE VISIT (OUTPATIENT)
Dept: URGENT CARE | Facility: CLINIC | Age: 80
End: 2020-10-12

## 2020-10-12 VITALS
SYSTOLIC BLOOD PRESSURE: 96 MMHG | BODY MASS INDEX: 31.1 KG/M2 | OXYGEN SATURATION: 96 % | WEIGHT: 210 LBS | HEIGHT: 69 IN | HEART RATE: 69 BPM | RESPIRATION RATE: 20 BRPM | DIASTOLIC BLOOD PRESSURE: 60 MMHG | TEMPERATURE: 97.6 F

## 2020-10-12 DIAGNOSIS — M76.892 TENDINITIS OF LEFT KNEE: Primary | ICD-10-CM

## 2021-01-25 ENCOUNTER — TELEPHONE (OUTPATIENT)
Dept: UROLOGY | Facility: MEDICAL CENTER | Age: 81
End: 2021-01-25

## 2021-01-25 NOTE — TELEPHONE ENCOUNTER
Patient called in stating he is having Urgent Care in [place] fax his Urine studies to [facility] office  Provided fax #

## 2021-01-25 NOTE — TELEPHONE ENCOUNTER
Patient called stating he went to an urgent care in [place] and he was diagnose with a UTI  Patient is taking antibiotics started taking them since Friday evening and he still having symptoms  He is having frequency and burning  He would like to know what to do        Patient can be reached at [phone]

## 2021-01-25 NOTE — TELEPHONE ENCOUNTER
Spoke to pt experiencing urinary frequency and urgency since 1/21/21  He went to [facility] for urine testing  Per Bobby Kent at [facility], pt's UC was negative  They had placed him on Cipro and then told him to discontinue it  Pt states he is taking finasteride and terazosin  Last OV with Dr Beata Reeves was on 8/18/20 for BPH w/obstruction/lower urinary tract symptoms  Scheduled pt with Dr Beata Reeves first available 4/5/21  He may be interested in discussing Urolift  Advised pt to try taking AZO for burning and frequency to see if it helps  If not he can call back to discuss further interventions

## 2021-03-09 ENCOUNTER — IMMUNIZATIONS (OUTPATIENT)
Dept: FAMILY MEDICINE CLINIC | Facility: HOSPITAL | Age: 81
End: 2021-03-09

## 2021-03-09 DIAGNOSIS — Z23 ENCOUNTER FOR IMMUNIZATION: Primary | ICD-10-CM

## 2021-03-09 PROCEDURE — 0001A SARS-COV-2 / COVID-19 MRNA VACCINE (PFIZER-BIONTECH) 30 MCG: CPT

## 2021-03-09 PROCEDURE — 91300 SARS-COV-2 / COVID-19 MRNA VACCINE (PFIZER-BIONTECH) 30 MCG: CPT

## 2021-03-30 ENCOUNTER — IMMUNIZATIONS (OUTPATIENT)
Dept: FAMILY MEDICINE CLINIC | Facility: HOSPITAL | Age: 81
End: 2021-03-30

## 2021-03-30 DIAGNOSIS — Z23 ENCOUNTER FOR IMMUNIZATION: Primary | ICD-10-CM

## 2021-03-30 PROCEDURE — 0002A SARS-COV-2 / COVID-19 MRNA VACCINE (PFIZER-BIONTECH) 30 MCG: CPT

## 2021-03-30 PROCEDURE — 91300 SARS-COV-2 / COVID-19 MRNA VACCINE (PFIZER-BIONTECH) 30 MCG: CPT

## 2021-09-28 ENCOUNTER — IMMUNIZATIONS (OUTPATIENT)
Dept: FAMILY MEDICINE CLINIC | Facility: HOSPITAL | Age: 81
End: 2021-09-28

## 2021-09-28 DIAGNOSIS — Z23 ENCOUNTER FOR IMMUNIZATION: Primary | ICD-10-CM

## 2021-09-28 PROCEDURE — 91300 SARS-COV-2 / COVID-19 MRNA VACCINE (PFIZER-BIONTECH) 30 MCG: CPT

## 2021-09-28 PROCEDURE — 0001A SARS-COV-2 / COVID-19 MRNA VACCINE (PFIZER-BIONTECH) 30 MCG: CPT

## 2021-10-19 ENCOUNTER — TELEPHONE (OUTPATIENT)
Dept: UROLOGY | Facility: AMBULATORY SURGERY CENTER | Age: 81
End: 2021-10-19

## 2021-12-10 RX ORDER — COLCHICINE 0.6 MG/1
TABLET ORAL
COMMUNITY
Start: 2021-07-12

## 2021-12-10 RX ORDER — MECLIZINE HYDROCHLORIDE 25 MG/1
25 TABLET ORAL 3 TIMES DAILY PRN
COMMUNITY
Start: 2021-07-30 | End: 2022-07-30

## 2021-12-13 ENCOUNTER — OFFICE VISIT (OUTPATIENT)
Dept: UROLOGY | Facility: MEDICAL CENTER | Age: 81
End: 2021-12-13
Payer: MEDICARE

## 2021-12-13 VITALS
DIASTOLIC BLOOD PRESSURE: 80 MMHG | SYSTOLIC BLOOD PRESSURE: 118 MMHG | BODY MASS INDEX: 31.25 KG/M2 | HEIGHT: 69 IN | WEIGHT: 211 LBS | HEART RATE: 73 BPM

## 2021-12-13 DIAGNOSIS — N13.8 BPH WITH OBSTRUCTION/LOWER URINARY TRACT SYMPTOMS: Primary | ICD-10-CM

## 2021-12-13 DIAGNOSIS — N40.1 BPH WITH OBSTRUCTION/LOWER URINARY TRACT SYMPTOMS: Primary | ICD-10-CM

## 2021-12-13 LAB
SL AMB  POCT GLUCOSE, UA: ABNORMAL
SL AMB LEUKOCYTE ESTERASE,UA: ABNORMAL
SL AMB POCT BILIRUBIN,UA: ABNORMAL
SL AMB POCT BLOOD,UA: ABNORMAL
SL AMB POCT CLARITY,UA: CLEAR
SL AMB POCT COLOR,UA: YELLOW
SL AMB POCT KETONES,UA: ABNORMAL
SL AMB POCT NITRITE,UA: ABNORMAL
SL AMB POCT PH,UA: 6.5
SL AMB POCT SPECIFIC GRAVITY,UA: 1.02
SL AMB POCT URINE PROTEIN: ABNORMAL
SL AMB POCT UROBILINOGEN: 0.2

## 2021-12-13 PROCEDURE — 99214 OFFICE O/P EST MOD 30 MIN: CPT | Performed by: PHYSICIAN ASSISTANT

## 2021-12-13 PROCEDURE — 81003 URINALYSIS AUTO W/O SCOPE: CPT | Performed by: PHYSICIAN ASSISTANT

## 2022-04-02 ENCOUNTER — IMMUNIZATIONS (OUTPATIENT)
Dept: FAMILY MEDICINE CLINIC | Facility: HOSPITAL | Age: 82
End: 2022-04-02

## 2022-04-02 PROCEDURE — 0054A COVID-19 PFIZER VACC TRIS-SUCROSE GRAY CAP 0.3 ML: CPT

## 2022-04-02 PROCEDURE — 91305 COVID-19 PFIZER VACC TRIS-SUCROSE GRAY CAP 0.3 ML: CPT

## 2023-02-17 ENCOUNTER — OFFICE VISIT (OUTPATIENT)
Dept: URGENT CARE | Facility: CLINIC | Age: 83
End: 2023-02-17

## 2023-02-17 VITALS
TEMPERATURE: 98.6 F | RESPIRATION RATE: 16 BRPM | OXYGEN SATURATION: 96 % | HEART RATE: 95 BPM | DIASTOLIC BLOOD PRESSURE: 86 MMHG | SYSTOLIC BLOOD PRESSURE: 164 MMHG

## 2023-02-17 DIAGNOSIS — U07.1 COVID-19: Primary | ICD-10-CM

## 2023-02-17 RX ORDER — ACETAMINOPHEN 500 MG
TABLET ORAL
COMMUNITY

## 2023-02-17 NOTE — PROGRESS NOTES
[facility]        NAME: Gina Aldana is a 80 y o  male  : 1940    MRN: [id number]  DATE: 2023  TIME: 6:35 PM    Assessment and Orders   COVID-19 [U07 1]  1  COVID-19              Plan and Discussion      Symptoms and exam consistent with COVID 19  Symptoms are mild, patient fully vaccinated, non smoker, no history of asthma or COPD  Had discussion about risks and benefits of Paxlovid and patient declines  Patient tested postive for COVID-19 today  Discussed current CDC guidelines  Patient was informed that they must remain at home on self isolation for 5 days since symptom onset and continue to wear a mask around others for an additional 5 days after that  Patient was informed that they must be fever free for at least 24 hours without medications prior to discontinuing isolation  Patient has been instructed to rest at home, drink plenty of fluids, take Tylenol or Motrin as needed, drink warm tea w/ lemon and honey, run a humidifier at home, and take a multivitamin daily  If symptoms persist despite treatment, patient is to follow up with PCP for re-check  If symptoms worsen,or any new symptoms present including but not limited to, fever, chest pain, shortness of breath, patient is to be seen in the ER  Patient verbalizes understanding and agrees w/ treatment plan  Chief Complaint     Chief Complaint   Patient presents with   • Cold Like Symptoms     Pt reports runny nose and irritated throat starting today, home COVID test was positive  Pt denies other symptoms  History of Present Illness       Tested positive for COVID today  No fevers or body aches  Has runny nose and scratchy throat  Fully vaccianted  Sore Throat   This is a new problem  The current episode started today  The problem has been unchanged  There has been no fever  Associated symptoms include congestion   Pertinent negatives include no coughing, headaches, swollen glands, trouble swallowing or vomiting  Review of Systems   Review of Systems   HENT: Positive for congestion and sore throat  Negative for trouble swallowing  Respiratory: Negative for cough  Gastrointestinal: Negative for vomiting  Neurological: Negative for headaches  Current Medications       Current Outpatient Medications:   •  acetaminophen (TYLENOL) 500 mg tablet, Indications: fever, pain , Disp: , Rfl:   •  allopurinol (ZYLOPRIM) 100 mg tablet, , Disp: , Rfl:   •  colchicine (COLCRYS) 0 6 mg tablet, 1tab twice a day for 2 days then one daily for 3 more days during gout flare, Disp: , Rfl:   •  EPINEPHrine (EPIPEN 2-SHAMA) 0 3 mg/0 3 mL SOAJ, Use as directed for allergic reaction  (Patient not taking: Reported on 12/13/2021), Disp: , Rfl:   •  ferrous sulfate 325 (65 Fe) mg tablet, 1 tab every other day for iron support, Disp: , Rfl:   •  finasteride (PROSCAR) 5 mg tablet, Take 1 tablet (5 mg total) by mouth daily, Disp: 90 tablet, Rfl: 3  •  meclizine (ANTIVERT) 25 mg tablet, Take 25 mg by mouth Three times daily as needed, Disp: , Rfl:   •  Melatonin 5 MG CAPS, Take one tab by mouth daily for sleep aid , Disp: , Rfl:   •  Multiple Vitamins-Minerals (CENTRUM SILVER 50+MEN PO), Take by mouth, Disp: , Rfl:   •  niacin 500 mg tablet, Take by mouth, Disp: , Rfl:   •  NIFEdipine (NIFEDICAL XL) 60 mg 24 hr tablet, Take by mouth, Disp: , Rfl:   •  NIFEdipine (PROCARDIA XL) 30 mg 24 hr tablet, , Disp: , Rfl:   •  Nutritional Supplements (PROSTAMEN) CAPS, ( PROSTA MEITO) Take 1 tab by mouth twice a day for prostate health , Disp: , Rfl:   •  Omega-3 1000 MG CAPS, Take by mouth, Disp: , Rfl:   •  pantoprazole (PROTONIX) 40 mg tablet, Take by mouth, Disp: , Rfl:   •  Probiotic Product (PROBIOTIC-10) CAPS, Take by mouth, Disp: , Rfl:   •  senna (SENOKOT) 8 6 MG tablet, Indications: Bowel Evacuation, Constipation   Take 1-3 tablets by mouth daily , Disp: , Rfl:   •  simvastatin (ZOCOR) 80 mg tablet, Take by mouth, Disp: , Rfl:   • sucralfate (CARAFATE) 1 g tablet, Take by mouth as needed , Disp: , Rfl:   •  terazosin (HYTRIN) 5 mg capsule, Take by mouth, Disp: , Rfl:   •  Zinc Sulfate (ZINC 15) 66 MG TABS, Take by mouth, Disp: , Rfl:     Current Allergies     Allergies as of 02/17/2023 - Reviewed 02/17/2023   Allergen Reaction Noted   • Latex  10/12/2020   • Other Other (See Comments)             The following portions of the patient's history were reviewed and updated as appropriate: allergies, current medications, past family history, past medical history, past social history, past surgical history and problem list      Past Medical History:   Diagnosis Date   • BPH with obstruction/lower urinary tract symptoms    • Dysuria    • Elevated PSA    • Nocturia        Past Surgical History:   Procedure Laterality Date   • CATARACT EXTRACTION     • COLONOSCOPY         No family history on file  Medications have been verified  Objective   /86   Pulse 95   Temp 98 6 °F (37 °C)   Resp 16   SpO2 96%   No LMP for male patient  Physical Exam     Physical Exam  Constitutional:       General: He is not in acute distress  Appearance: He is not ill-appearing or toxic-appearing  HENT:      Head: Normocephalic and atraumatic  Right Ear: Tympanic membrane and external ear normal       Left Ear: Tympanic membrane and external ear normal       Nose: Congestion present  Comments: Boggy nasal turbinates     Mouth/Throat:      Pharynx: Posterior oropharyngeal erythema present  Comments: Cobblestone appearance in posterior pharynx  Cardiovascular:      Rate and Rhythm: Normal rate and regular rhythm  Pulmonary:      Effort: Pulmonary effort is normal  No respiratory distress  Breath sounds: No wheezing  Neurological:      General: No focal deficit present  Mental Status: He is alert and oriented to person, place, and time     Psychiatric:         Mood and Affect: Mood normal          Behavior: Behavior normal          Thought Content:  Thought content normal          Judgment: Judgment normal                Frankey Fine Viotto DO

## 2023-03-01 ENCOUNTER — OFFICE VISIT (OUTPATIENT)
Dept: UROLOGY | Facility: MEDICAL CENTER | Age: 83
End: 2023-03-01

## 2023-03-01 VITALS
HEIGHT: 69 IN | BODY MASS INDEX: 34.8 KG/M2 | DIASTOLIC BLOOD PRESSURE: 82 MMHG | HEART RATE: 68 BPM | SYSTOLIC BLOOD PRESSURE: 132 MMHG | WEIGHT: 235 LBS

## 2023-03-01 DIAGNOSIS — N13.8 BPH WITH OBSTRUCTION/LOWER URINARY TRACT SYMPTOMS: Primary | ICD-10-CM

## 2023-03-01 DIAGNOSIS — N40.1 BPH WITH OBSTRUCTION/LOWER URINARY TRACT SYMPTOMS: Primary | ICD-10-CM

## 2023-03-01 LAB
SL AMB  POCT GLUCOSE, UA: NORMAL
SL AMB LEUKOCYTE ESTERASE,UA: NORMAL
SL AMB POCT BILIRUBIN,UA: NORMAL
SL AMB POCT BLOOD,UA: NORMAL
SL AMB POCT CLARITY,UA: CLEAR
SL AMB POCT COLOR,UA: YELLOW
SL AMB POCT KETONES,UA: NORMAL
SL AMB POCT NITRITE,UA: NORMAL
SL AMB POCT PH,UA: 6
SL AMB POCT SPECIFIC GRAVITY,UA: 1.01
SL AMB POCT URINE PROTEIN: NORMAL
SL AMB POCT UROBILINOGEN: 0.2

## 2023-03-01 NOTE — PROGRESS NOTES
3/1/2023      Chief Complaint   Patient presents with   • Benign Prostatic Hypertrophy         Assessment and Plan    80 y o  male managed by Dr Yadira Fajardo     1  BPH with lower urinary tract symptoms  • Urine today: negative  • AUA score: 4  • Managed well on combination therapy with terazosin and finasteride  ? No refills needed today  • GEOFFREY deferred today  Annual PSA screening discontinued previously due to age and overall stability  • Offered annual follow-up with his PCP and as needed follow-up with our office  Patient prefers to continue annual management through our office  Follow-up in 1 year  History of Present Illness  Angie Shelton is a 80 y o  male here for evaluation of BPH  Patient is managed on combination therapy of terazosin and finasteride  Prostate cancer screening previously discontinued due to patient's age and overall stability  He continues to feel very well controlled on his combination therapy for his BPH symptoms  He reports nocturia once per night but denies any issues with daytime urinary frequency, urgency, or dysuria  He denies any difficulty voiding or incontinence  He denies any episodes of gross hematuria  He denies any flank, abdominal, or testicular pain  He denies any recent signs of infection including fevers or chills  He is agreeable to plan above  Review of Systems   Constitutional: Negative for chills and fever  HENT: Negative  Respiratory: Negative  Cardiovascular: Negative  Gastrointestinal: Negative for abdominal pain, nausea and vomiting  Genitourinary: Negative for difficulty urinating, dysuria, frequency, hematuria, scrotal swelling, testicular pain and urgency  Nocturia 1x per night  Denies incontinence   Musculoskeletal: Negative  Skin: Negative  Neurological: Negative        AUA SYMPTOM SCORE    Flowsheet Row Most Recent Value   AUA SYMPTOM SCORE    How often have you had a sensation of not emptying your bladder completely after you finished urinating? 0 (P)     How often have you had to urinate again less than two hours after you finished urinating? 1 (P)     How often have you found you stopped and started again several times when you urinate? 0 (P)     How often have you found it difficult to postpone urination? 1 (P)     How often have you had a weak urinary stream? 1 (P)     How often have you had to push or strain to begin urination? 0 (P)     How many times did you most typically get up to urinate from the time you went to bed at night until the time you got up in the morning? 1 (P)     Quality of Life: If you were to spend the rest of your life with your urinary condition just the way it is now, how would you feel about that? 1 (P)     AUA SYMPTOM SCORE 4 (P)            Vitals  Vitals:    03/01/23 1011   Weight: 107 kg (235 lb)   Height: 5' 9" (1 753 m)       Physical Exam  Vitals reviewed  Constitutional:       General: He is not in acute distress  Appearance: Normal appearance  He is obese  He is not ill-appearing, toxic-appearing or diaphoretic  HENT:      Head: Normocephalic and atraumatic  Eyes:      Conjunctiva/sclera: Conjunctivae normal    Pulmonary:      Effort: Pulmonary effort is normal  No respiratory distress  Genitourinary:     Comments: GEOFFREY deferred today  Musculoskeletal:         General: Normal range of motion  Cervical back: Normal range of motion  Skin:     General: Skin is warm and dry  Neurological:      General: No focal deficit present  Mental Status: He is alert and oriented to person, place, and time  Psychiatric:         Mood and Affect: Mood normal          Behavior: Behavior normal          Thought Content:  Thought content normal          Judgment: Judgment normal        Past History  Past Medical History:   Diagnosis Date   • BPH with obstruction/lower urinary tract symptoms    • Dysuria    • Elevated PSA    • Nocturia      Social History     Socioeconomic History   • Marital status:      Spouse name: None   • Number of children: None   • Years of education: None   • Highest education level: None   Occupational History   • None   Tobacco Use   • Smoking status: Former   • Smokeless tobacco: Never   Substance and Sexual Activity   • Alcohol use: Yes   • Drug use: No   • Sexual activity: None   Other Topics Concern   • None   Social History Narrative   • None     Social Determinants of Health     Financial Resource Strain: Not on file   Food Insecurity: Not on file   Transportation Needs: Not on file   Physical Activity: Not on file   Stress: Not on file   Social Connections: Not on file   Intimate Partner Violence: Not on file   Housing Stability: Not on file     Social History     Tobacco Use   Smoking Status Former   Smokeless Tobacco Never     History reviewed  No pertinent family history  The following portions of the patient's history were reviewed and updated as appropriate: allergies, current medications, past medical history, past social history, past surgical history and problem list     Results  No results found for this or any previous visit (from the past 1 hour(s))  ]  No results found for: PSA  No results found for: GLUCOSE, CALCIUM, NA, K, CO2, CL, BUN, CREATININE  No results found for: WBC, HGB, HCT, MCV, PLT    Pati Sheets PA-C

## 2023-12-07 ENCOUNTER — TELEPHONE (OUTPATIENT)
Age: 83
End: 2023-12-07

## 2023-12-07 NOTE — TELEPHONE ENCOUNTER
Patient calling in to verify we have the correct phone numbers in for him and to take out his landline home number that he has now disconnected. All info is updated.

## 2024-06-07 ENCOUNTER — OFFICE VISIT (OUTPATIENT)
Dept: UROLOGY | Facility: MEDICAL CENTER | Age: 84
End: 2024-06-07
Payer: COMMERCIAL

## 2024-06-07 VITALS
HEIGHT: 69 IN | BODY MASS INDEX: 32.88 KG/M2 | WEIGHT: 222 LBS | DIASTOLIC BLOOD PRESSURE: 90 MMHG | OXYGEN SATURATION: 90 % | SYSTOLIC BLOOD PRESSURE: 140 MMHG | HEART RATE: 84 BPM

## 2024-06-07 DIAGNOSIS — N40.1 BPH WITH OBSTRUCTION/LOWER URINARY TRACT SYMPTOMS: Primary | ICD-10-CM

## 2024-06-07 DIAGNOSIS — N13.8 BPH WITH OBSTRUCTION/LOWER URINARY TRACT SYMPTOMS: Primary | ICD-10-CM

## 2024-06-07 LAB
POST-VOID RESIDUAL VOLUME, ML POC: 239 ML
SL AMB  POCT GLUCOSE, UA: NORMAL
SL AMB LEUKOCYTE ESTERASE,UA: NORMAL
SL AMB POCT BILIRUBIN,UA: NORMAL
SL AMB POCT BLOOD,UA: NORMAL
SL AMB POCT CLARITY,UA: CLEAR
SL AMB POCT COLOR,UA: YELLOW
SL AMB POCT KETONES,UA: NORMAL
SL AMB POCT NITRITE,UA: NORMAL
SL AMB POCT PH,UA: 6.5
SL AMB POCT SPECIFIC GRAVITY,UA: 1.01
SL AMB POCT URINE PROTEIN: NORMAL
SL AMB POCT UROBILINOGEN: 0.2

## 2024-06-07 PROCEDURE — 81003 URINALYSIS AUTO W/O SCOPE: CPT | Performed by: UROLOGY

## 2024-06-07 PROCEDURE — 51798 US URINE CAPACITY MEASURE: CPT | Performed by: UROLOGY

## 2024-06-07 PROCEDURE — 99213 OFFICE O/P EST LOW 20 MIN: CPT | Performed by: UROLOGY

## 2024-06-07 NOTE — ASSESSMENT & PLAN NOTE
BPH on combination of terazosin and finasteride  LUTS stable with no bothersome symptoms per patient   cc today, but patient reports not emptying his bladder fully in bathroom and does not wish to void again and repeat PVR  Normal renal function    - continue terazosin  - continue finasteride    RTC in 1 year

## 2024-06-07 NOTE — PROGRESS NOTES
6/7/2024    Mayco Mckee  1940  [id number]    1. BPH with obstruction/lower urinary tract symptoms  Assessment & Plan:  BPH on combination of terazosin and finasteride  LUTS stable with no bothersome symptoms per patient   cc today, but patient reports not emptying his bladder fully in bathroom and does not wish to void again and repeat PVR  Normal renal function    - continue terazosin  - continue finasteride    RTC in 1 year  Orders:  -     POCT urine dip auto non-scope  -     POCT Measure PVR       History of Present Illness  83 y.o. male with a history of BPH on combination terazosin and finasteride   LUTS remain stable  No bothersome urinary symptoms  No hematuria or dysuria   Feels good with no complaints     cc but patient reports that he did not empty his bladder fully in bathroom, but does not wish to go back to bathroom to empty fully at this time or have his PVR rechecked.      Serum creatinine normal at 0.96 in 3/2024    Patient reports that he does not currently need any refills of his BPH medications at this time      AUA Symptom Score  AUA SYMPTOM SCORE      Flowsheet Row Most Recent Value   AUA SYMPTOM SCORE    How often have you had a sensation of not emptying your bladder completely after you finished urinating? 0 (P)     How often have you had to urinate again less than two hours after you finished urinating? 1 (P)     How often have you found you stopped and started again several times when you urinate? 0 (P)     How often have you found it difficult to postpone urination? 1 (P)     How often have you had a weak urinary stream? 1 (P)     How often have you had to push or strain to begin urination? 0 (P)     How many times did you most typically get up to urinate from the time you went to bed at night until the time you got up in the morning? 1 (P)     Quality of Life: If you were to spend the rest of your life with your urinary condition just the way it is now, how would you  feel about that? 2 (P)     AUA SYMPTOM SCORE 4 (P)              Review of Systems   All other systems reviewed and are negative.      Past Medical History  Past Medical History:   Diagnosis Date    BPH with obstruction/lower urinary tract symptoms     Dysuria     Elevated PSA     Nocturia        Past Social History  Past Surgical History:   Procedure Laterality Date    CATARACT EXTRACTION      COLONOSCOPY         Past Family History  History reviewed. No pertinent family history.    Past Social history  Social History     Socioeconomic History    Marital status:      Spouse name: Not on file    Number of children: Not on file    Years of education: Not on file    Highest education level: Not on file   Occupational History    Not on file   Tobacco Use    Smoking status: Former    Smokeless tobacco: Never   Substance and Sexual Activity    Alcohol use: Yes    Drug use: No    Sexual activity: Not on file   Other Topics Concern    Not on file   Social History Narrative    Not on file     Social Determinants of Health     Financial Resource Strain: Low Risk  (7/5/2023)    Received from [facility]    Overall Financial Resource Strain (CARDIA)     Difficulty of Paying Living Expenses: Not hard at all   Food Insecurity: No Food Insecurity (7/5/2023)    Received from [facility]    Hunger Vital Sign     Worried About Running Out of Food in the Last Year: Never true     Ran Out of Food in the Last Year: Never true   Transportation Needs: No Transportation Needs (7/5/2023)    Received from [facility]    PRAPARE - Transportation     Lack of Transportation (Medical): No     Lack of Transportation (Non-Medical): No   Physical Activity: Not on file   Stress: No Stress Concern Present (7/5/2023)    Received from [facility]    [employer] - Occupational Stress Questionnaire     Feeling of Stress : Not at all   Social  Connections: Moderately Integrated (7/5/2023)    Received from [facility]    Social Connection and Isolation Panel [NHANES]     Frequency of Communication with Friends and Family: More than three times a week     Frequency of Social Gatherings with Friends and Family: More than three times a week     Attends Church Services: More than 4 times per year     Active Member of Clubs or Organizations: Yes     Attends Club or Organization Meetings: 1 to 4 times per year     Marital Status:    Intimate Partner Violence: Not At Risk (7/5/2023)    Received from [facility]    Humiliation, Afraid, Rape, and Kick questionnaire     Fear of Current or Ex-Partner: No     Emotionally Abused: No     Physically Abused: No     Sexually Abused: No   Housing Stability: Not on file       Current Medications  Current Outpatient Medications   Medication Sig Dispense Refill    allopurinol (ZYLOPRIM) 100 mg tablet       EPINEPHrine (EPIPEN) 0.3 mg/0.3 mL SOAJ Use as directed for allergic reaction       finasteride (PROSCAR) 5 mg tablet Take 1 tablet (5 mg total) by mouth daily 90 tablet 3    Multiple Vitamins-Minerals (CENTRUM SILVER 50+MEN PO) Take by mouth      niacin 500 mg tablet Take by mouth      NIFEdipine (PROCARDIA XL) 30 mg 24 hr tablet       Nutritional Supplements (PROSTAMEN) CAPS ( PROSTA MEITO) Take 1 tab by mouth twice a day for prostate health.      pantoprazole (PROTONIX) 40 mg tablet Take by mouth      Probiotic Product (PROBIOTIC-10) CAPS Take by mouth      senna (SENOKOT) 8.6 MG tablet Indications: Bowel Evacuation, Constipation. Take 1-3 tablets by mouth daily       simvastatin (ZOCOR) 80 mg tablet Take by mouth      terazosin (HYTRIN) 5 mg capsule Take by mouth      Zinc Sulfate 66 MG TABS Take by mouth      acetaminophen (TYLENOL) 500 mg tablet Indications: fever, pain.      colchicine (COLCRYS) 0.6 mg tablet 1tab twice a day for 2 days then one daily for 3 more days during  "gout flare      ferrous sulfate 325 (65 Fe) mg tablet 1 tab every other day for iron support (Patient not taking: Reported on 6/7/2024)      meclizine (ANTIVERT) 25 mg tablet Take 25 mg by mouth Three times daily as needed      Melatonin 5 MG CAPS Take one tab by mouth daily for sleep aid. (Patient not taking: Reported on 6/7/2024)      NIFEdipine (PROCARDIA XL) 60 mg 24 hr tablet Take by mouth (Patient not taking: Reported on 6/7/2024)      Omega-3 1000 MG CAPS Take by mouth (Patient not taking: Reported on 6/7/2024)      sucralfate (CARAFATE) 1 g tablet Take by mouth as needed  (Patient not taking: Reported on 6/7/2024)       No current facility-administered medications for this visit.       Allergies  Allergies   Allergen Reactions    Latex      hives    Other Other (See Comments)     Hives/Urticaria/sore to mouth       Past Medical History, Social History, Family History, medications and allergies were reviewed.    Vitals  Vitals:    06/07/24 1303   BP: 140/90   Pulse: 84   SpO2: 90%   Weight: 101 kg (222 lb)   Height: 5' 9\" (1.753 m)       Physical Exam  Constitutional:       Appearance: Normal appearance. He is normal weight.   HENT:      Head: Normocephalic.      Nose: Nose normal. No congestion.   Eyes:      Conjunctiva/sclera: Conjunctivae normal.   Cardiovascular:      Rate and Rhythm: Normal rate.   Pulmonary:      Effort: Pulmonary effort is normal. No respiratory distress.   Abdominal:      General: Abdomen is flat. There is no distension.      Palpations: Abdomen is soft.      Tenderness: There is no right CVA tenderness or left CVA tenderness.   Genitourinary:     Comments: Patient deferred GEOFFREY  Musculoskeletal:      Comments: Normal gait   Skin:     General: Skin is warm and dry.   Neurological:      General: No focal deficit present.      Mental Status: He is alert and oriented to person, place, and time.   Psychiatric:         Mood and Affect: Mood normal.         Results  No results found for: " "\"PSA\"  Lab Results   Component Value Date    CALCIUM 9.8 03/11/2024    K 4.3 03/11/2024    CO2 24 03/11/2024     03/11/2024    BUN 16 03/11/2024    CREATININE 0.96 03/11/2024     No results found for: \"WBC\", \"HGB\", \"HCT\", \"MCV\", \"PLT\"    Office Urine Dip  Recent Results (from the past 1 hour(s))   POCT urine dip auto non-scope    Collection Time: 06/07/24  1:06 PM   Result Value Ref Range     COLOR,UA yellow     CLARITY,UA clear     SPECIFIC GRAVITY,UA 1.010      PH,UA 6.5     LEUKOCYTE ESTERASE,UA neg     NITRITE,UA neg     GLUCOSE, UA neg     KETONES,UA neg     BILIRUBIN,UA neg     BLOOD,UA neg     POCT URINE PROTEIN neg     SL AMB POCT UROBILINOGEN 0.2    POCT Measure PVR    Collection Time: 06/07/24  1:12 PM   Result Value Ref Range    POST-VOID RESIDUAL VOLUME, ML  mL   ]    "

## 2024-11-07 ENCOUNTER — OFFICE VISIT (OUTPATIENT)
Dept: URGENT CARE | Facility: CLINIC | Age: 84
End: 2024-11-07
Payer: COMMERCIAL

## 2024-11-07 ENCOUNTER — APPOINTMENT (OUTPATIENT)
Dept: RADIOLOGY | Facility: CLINIC | Age: 84
End: 2024-11-07
Payer: COMMERCIAL

## 2024-11-07 VITALS
HEIGHT: 69 IN | SYSTOLIC BLOOD PRESSURE: 132 MMHG | HEART RATE: 83 BPM | DIASTOLIC BLOOD PRESSURE: 82 MMHG | RESPIRATION RATE: 18 BRPM | TEMPERATURE: 99 F | WEIGHT: 220.6 LBS | BODY MASS INDEX: 32.67 KG/M2 | OXYGEN SATURATION: 99 %

## 2024-11-07 DIAGNOSIS — S29.9XXA RIB INJURY: ICD-10-CM

## 2024-11-07 DIAGNOSIS — S22.31XA CLOSED FRACTURE OF ONE RIB OF RIGHT SIDE, INITIAL ENCOUNTER: Primary | ICD-10-CM

## 2024-11-07 PROCEDURE — 71101 X-RAY EXAM UNILAT RIBS/CHEST: CPT

## 2024-11-07 PROCEDURE — 99213 OFFICE O/P EST LOW 20 MIN: CPT | Performed by: NURSE PRACTITIONER

## 2024-11-07 NOTE — PROGRESS NOTES
[facility]        NAME: Mayco Mckee is a 84 y.o. male  : 1940    MRN: [id number]  DATE: 2024  TIME: 12:16 PM    Assessment and Plan   Closed fracture of one rib of right side, initial encounter [S22.31XA]  1. Closed fracture of one rib of right side, initial encounter  CANCELED: XR ribs 2 vw right            Patient Instructions       Rib fracture  Incentive spirometer given to patient at the clinic  Demonstration on proper use; observed patient use it at the clinic  Follow up with PCP  Proceed to  ER if symptoms worsen.    If tests have been performed at [facility], our office will contact you with results if changes need to be made to the care plan discussed with you at the visit.  [facility]    Chief Complaint     Chief Complaint   Patient presents with    Rib Pain     Pt states on Tuesday he was working in the garden and he was reaching over and felt pain on the ride side of his rib cage. He took ibuprofen last night and applied a heating pad on it for relief          History of Present Illness       HPI  Reports rib pain that started 2 days ago. This is while gardening. He had reached for a plant, with out-stretched right arm. He heard a pop over the right ribs. Has been in pain since. Worse with deep breathing. Better at rest.     Review of Systems   Review of Systems   Constitutional:  Negative for fever.   Respiratory:  Negative for cough, chest tightness and wheezing.    Cardiovascular:  Positive for chest pain (right side of the lower chest). Negative for palpitations.   Skin:  Negative for rash and wound.   Neurological:  Negative for light-headedness.         Current Medications       Current Outpatient Medications:     acetaminophen (TYLENOL) 500 mg tablet, Indications: fever, pain., Disp: , Rfl:     allopurinol (ZYLOPRIM) 100 mg tablet, , Disp: , Rfl:     colchicine (COLCRYS) 0.6 mg tablet, 1tab twice a day for 2 days then one  daily for 3 more days during gout flare, Disp: , Rfl:     EPINEPHrine (EPIPEN) 0.3 mg/0.3 mL SOAJ, Use as directed for allergic reaction , Disp: , Rfl:     ferrous sulfate 325 (65 Fe) mg tablet, 1 tab every other day for iron support (Patient not taking: Reported on 6/7/2024), Disp: , Rfl:     finasteride (PROSCAR) 5 mg tablet, Take 1 tablet (5 mg total) by mouth daily, Disp: 90 tablet, Rfl: 3    meclizine (ANTIVERT) 25 mg tablet, Take 25 mg by mouth Three times daily as needed, Disp: , Rfl:     Melatonin 5 MG CAPS, Take one tab by mouth daily for sleep aid. (Patient not taking: Reported on 6/7/2024), Disp: , Rfl:     Multiple Vitamins-Minerals (CENTRUM SILVER 50+MEN PO), Take by mouth, Disp: , Rfl:     niacin 500 mg tablet, Take by mouth, Disp: , Rfl:     NIFEdipine (PROCARDIA XL) 30 mg 24 hr tablet, , Disp: , Rfl:     NIFEdipine (PROCARDIA XL) 60 mg 24 hr tablet, Take by mouth (Patient not taking: Reported on 6/7/2024), Disp: , Rfl:     Nutritional Supplements (PROSTAMEN) CAPS, ( PROSTA MEITO) Take 1 tab by mouth twice a day for prostate health., Disp: , Rfl:     Omega-3 1000 MG CAPS, Take by mouth (Patient not taking: Reported on 6/7/2024), Disp: , Rfl:     pantoprazole (PROTONIX) 40 mg tablet, Take by mouth, Disp: , Rfl:     Probiotic Product (PROBIOTIC-10) CAPS, Take by mouth, Disp: , Rfl:     senna (SENOKOT) 8.6 MG tablet, Indications: Bowel Evacuation, Constipation. Take 1-3 tablets by mouth daily , Disp: , Rfl:     simvastatin (ZOCOR) 80 mg tablet, Take by mouth, Disp: , Rfl:     sucralfate (CARAFATE) 1 g tablet, Take by mouth as needed  (Patient not taking: Reported on 6/7/2024), Disp: , Rfl:     terazosin (HYTRIN) 5 mg capsule, Take by mouth, Disp: , Rfl:     Zinc Sulfate 66 MG TABS, Take by mouth, Disp: , Rfl:     Current Allergies     Allergies as of 11/07/2024 - Reviewed 11/07/2024   Allergen Reaction Noted    Latex  10/12/2020    Other Other (See Comments)             The following portions of the  "patient's history were reviewed and updated as appropriate: allergies, current medications, past family history, past medical history, past social history, past surgical history and problem list.     Past Medical History:   Diagnosis Date    BPH with obstruction/lower urinary tract symptoms     Dysuria     Elevated PSA     Nocturia        Past Surgical History:   Procedure Laterality Date    CATARACT EXTRACTION      COLONOSCOPY         No family history on file.      Medications have been verified.        Objective   /82   Pulse 83   Temp 99 °F (37.2 °C) (Tympanic)   Resp 18   Ht 5' 9\" (1.753 m)   Wt 100 kg (220 lb 9.6 oz)   SpO2 99%   BMI 32.58 kg/m²   No LMP for male patient.       Physical Exam     Physical Exam  Constitutional:       General: He is not in acute distress.     Appearance: He is not ill-appearing.   Cardiovascular:      Rate and Rhythm: Regular rhythm.      Heart sounds: Normal heart sounds.   Pulmonary:      Effort: Pulmonary effort is normal.      Breath sounds: Normal breath sounds.   Musculoskeletal:         General: Tenderness (with palpation of the ribs just below the right breast, laterally) present. No deformity.   Skin:     Findings: No bruising or rash.                   "

## 2025-04-25 ENCOUNTER — TELEPHONE (OUTPATIENT)
Dept: UROLOGY | Facility: MEDICAL CENTER | Age: 85
End: 2025-04-25

## 2025-04-25 NOTE — TELEPHONE ENCOUNTER
Spoke to patient to see about scheduling his 1yr f/u appt with Dr. Bustillo at the [place] office for June 2025 which was on recall.   He said that he will call back to schedule because he was not home and he needs his calendar to look at.

## 2025-06-25 ENCOUNTER — OFFICE VISIT (OUTPATIENT)
Dept: UROLOGY | Facility: MEDICAL CENTER | Age: 85
End: 2025-06-25
Payer: COMMERCIAL

## 2025-06-25 VITALS
HEART RATE: 100 BPM | DIASTOLIC BLOOD PRESSURE: 70 MMHG | OXYGEN SATURATION: 97 % | HEIGHT: 69 IN | BODY MASS INDEX: 32.29 KG/M2 | SYSTOLIC BLOOD PRESSURE: 120 MMHG | WEIGHT: 218 LBS

## 2025-06-25 DIAGNOSIS — N40.1 BPH WITH OBSTRUCTION/LOWER URINARY TRACT SYMPTOMS: Primary | ICD-10-CM

## 2025-06-25 DIAGNOSIS — N13.8 BPH WITH OBSTRUCTION/LOWER URINARY TRACT SYMPTOMS: Primary | ICD-10-CM

## 2025-06-25 LAB
POST-VOID RESIDUAL VOLUME, ML POC: 63 ML
SL AMB  POCT GLUCOSE, UA: NORMAL
SL AMB LEUKOCYTE ESTERASE,UA: NORMAL
SL AMB POCT BILIRUBIN,UA: NORMAL
SL AMB POCT BLOOD,UA: NORMAL
SL AMB POCT CLARITY,UA: CLEAR
SL AMB POCT COLOR,UA: YELLOW
SL AMB POCT KETONES,UA: NORMAL
SL AMB POCT NITRITE,UA: NORMAL
SL AMB POCT PH,UA: 7
SL AMB POCT SPECIFIC GRAVITY,UA: 1.01
SL AMB POCT URINE PROTEIN: NORMAL
SL AMB POCT UROBILINOGEN: 1

## 2025-06-25 PROCEDURE — 99213 OFFICE O/P EST LOW 20 MIN: CPT | Performed by: UROLOGY

## 2025-06-25 PROCEDURE — 81003 URINALYSIS AUTO W/O SCOPE: CPT | Performed by: UROLOGY

## 2025-06-25 PROCEDURE — 51798 US URINE CAPACITY MEASURE: CPT | Performed by: UROLOGY

## 2025-06-25 RX ORDER — NEOMYCIN SULFATE, POLYMYXIN B SULFATE, AND DEXAMETHASONE 3.5; 10000; 1 MG/G; [USP'U]/G; MG/G
OINTMENT OPHTHALMIC
COMMUNITY
Start: 2025-05-29

## 2025-06-25 NOTE — PROGRESS NOTES
Name: Mayco Mckee      : 1940      MRN: [id number]  Encounter Provider: Isaiah Bustillo MD  Encounter Date: 2025   [facility]  :  Assessment & Plan  BPH with obstruction/lower urinary tract symptoms  BPH on combination of terazosin and finasteride  LUTS stable with no bothersome symptoms per patient  PVR 63 cc on bladder scan today  Udip negative today   - continue terazosin 5 mg  - continue finasteride 5 mg    Orders:    POCT Measure PVR    POCT urine dip auto non-scope        History of Present Illness   Mayco Mckee is a 84 y.o. male who presents for routine annual follow up    LUTS remain stable on current regimen of terazosin and finasteride    No hematuria or dysuria  LUTS stable  Nocturia x 1    Emptying bladder wall with PVR 63 cc on bladder scan today      AUA SYMPTOM SCORE      Flowsheet Row Most Recent Value   AUA SYMPTOM SCORE    How often have you had a sensation of not emptying your bladder completely after you finished urinating? 1 (P)     How often have you had to urinate again less than two hours after you finished urinating? 1 (P)     How often have you found you stopped and started again several times when you urinate? 0 (P)     How often have you found it difficult to postpone urination? 1 (P)     How often have you had a weak urinary stream? 1 (P)     How often have you had to push or strain to begin urination? 0 (P)     How many times did you most typically get up to urinate from the time you went to bed at night until the time you got up in the morning? 1 (P)     Quality of Life: If you were to spend the rest of your life with your urinary condition just the way it is now, how would you feel about that? 2 (P)     AUA SYMPTOM SCORE 5 (P)            Review of Systems   All other systems reviewed and are negative.    Past Medical History   Past Medical History[1]  Past Surgical History[2]  Family History[3]   reports that he  "has quit smoking. He has never used smokeless tobacco. He reports current alcohol use. He reports that he does not use drugs.  Current Outpatient Medications   Medication Instructions    acetaminophen (TYLENOL) 500 mg tablet     allopurinol (ZYLOPRIM) 100 mg tablet No dose, route, or frequency recorded.    colchicine (COLCRYS) 0.6 mg tablet     EPINEPHrine (EPIPEN) 0.3 mg/0.3 mL SOAJ Use as directed for allergic reaction     ferrous sulfate 325 (65 Fe) mg tablet     finasteride (PROSCAR) 5 mg, Oral, Daily    meclizine (ANTIVERT) 25 mg, Oral, 3 times daily PRN    Melatonin 5 MG CAPS     Multiple Vitamins-Minerals (CENTRUM SILVER 50+MEN PO) Take by mouth    neomycin-polymyxin-dexamethasone (MAXITROL) 0.35%-10,000 units/g-0.1% APPLY 1/4 INCH RIBBON TOPICALLY IN THE RIGHT EYE TWO TIMES A DAY    niacin 500 mg tablet Take by mouth    NIFEdipine (PROCARDIA XL) 30 mg 24 hr tablet No dose, route, or frequency recorded.    NIFEdipine (PROCARDIA XL) 60 mg 24 hr tablet Oral    Nutritional Supplements (PROSTAMEN) CAPS     pantoprazole (PROTONIX) 40 mg tablet Take by mouth    Probiotic Product (PROBIOTIC-10) CAPS Take by mouth    senna (SENOKOT) 8.6 MG tablet     simvastatin (ZOCOR) 80 mg tablet Take by mouth    sucralfate (CARAFATE) 1 g tablet Oral, As needed    terazosin (HYTRIN) 5 mg capsule Take by mouth    Zinc Sulfate 66 MG TABS Take by mouth   Allergies[4]      Objective   /70 (BP Location: Left arm, Patient Position: Sitting, Cuff Size: Standard)   Pulse 100   Ht 5' 9\" (1.753 m)   Wt 98.9 kg (218 lb)   SpO2 97%   BMI 32.19 kg/m²     Physical Exam  Vitals and nursing note reviewed.   Constitutional:       General: He is not in acute distress.     Appearance: He is well-developed.   HENT:      Head: Normocephalic and atraumatic.     Eyes:      Conjunctiva/sclera: Conjunctivae normal.       Cardiovascular:      Rate and Rhythm: Normal rate and regular rhythm.      Heart sounds: No murmur heard.  Pulmonary:      " "Effort: Pulmonary effort is normal. No respiratory distress.      Breath sounds: Normal breath sounds.   Abdominal:      Palpations: Abdomen is soft.      Tenderness: There is no abdominal tenderness.     Musculoskeletal:         General: No swelling.      Cervical back: Neck supple.     Skin:     General: Skin is warm and dry.      Capillary Refill: Capillary refill takes less than 2 seconds.     Neurological:      General: No focal deficit present.      Mental Status: He is alert and oriented to person, place, and time.     Psychiatric:         Mood and Affect: Mood normal.          Results   No results found for: \"PSA\"  Lab Results   Component Value Date    CALCIUM 9.4 06/27/2024    K 3.8 06/27/2024    CO2 23 06/27/2024     06/27/2024    BUN 10 06/27/2024    CREATININE 0.93 06/27/2024     No results found for: \"WBC\", \"HGB\", \"HCT\", \"MCV\", \"PLT\"    Office Urine Dip  Recent Results (from the past hour)   POCT Measure PVR    Collection Time: 06/25/25 10:26 AM   Result Value Ref Range    POST-VOID RESIDUAL VOLUME, ML POC 63 mL   POCT urine dip auto non-scope    Collection Time: 06/25/25 10:31 AM   Result Value Ref Range     COLOR,UA yellow     CLARITY,UA clear     SPECIFIC GRAVITY,UA 1.015      PH,UA 7.0     LEUKOCYTE ESTERASE,UA neg     NITRITE,UA neg     GLUCOSE, UA neg     KETONES,UA neg     BILIRUBIN,UA neg     BLOOD,UA neg     POCT URINE PROTEIN neg     SL AMB POCT UROBILINOGEN 1.0                 [1]   Past Medical History:  Diagnosis Date    BPH with obstruction/lower urinary tract symptoms     Dysuria     Elevated PSA     Nocturia    [2]   Past Surgical History:  Procedure Laterality Date    CATARACT EXTRACTION      COLONOSCOPY     [3] No family history on file.  [4]   Allergies  Allergen Reactions    Latex      hives    Other Other (See Comments)     Hives/Urticaria/sore to mouth     "

## 2025-06-25 NOTE — ASSESSMENT & PLAN NOTE
BPH on combination of terazosin and finasteride  LUTS stable with no bothersome symptoms per patient  PVR 63 cc on bladder scan today  Udip negative today   - continue terazosin 5 mg  - continue finasteride 5 mg    Orders:    POCT Measure PVR    POCT urine dip auto non-scope